# Patient Record
Sex: FEMALE | Race: WHITE | Employment: UNEMPLOYED | URBAN - METROPOLITAN AREA
[De-identification: names, ages, dates, MRNs, and addresses within clinical notes are randomized per-mention and may not be internally consistent; named-entity substitution may affect disease eponyms.]

---

## 2018-03-22 ENCOUNTER — HOSPITAL ENCOUNTER (EMERGENCY)
Age: 63
Discharge: HOME | End: 2018-03-22
Attending: EMERGENCY MEDICINE
Payer: COMMERCIAL

## 2018-03-22 VITALS
HEIGHT: 62 IN | DIASTOLIC BLOOD PRESSURE: 67 MMHG | WEIGHT: 180 LBS | HEART RATE: 55 BPM | OXYGEN SATURATION: 96 % | SYSTOLIC BLOOD PRESSURE: 129 MMHG | RESPIRATION RATE: 19 BRPM | TEMPERATURE: 97.7 F | BODY MASS INDEX: 33.13 KG/M2

## 2018-03-22 DIAGNOSIS — R55 SYNCOPE, UNSPECIFIED SYNCOPE TYPE: Primary | ICD-10-CM

## 2018-03-22 LAB
% CKMB: 5.6 % (ref 0–3)
ABSOLUTE EOS #: 0 K/UL (ref 0–0.4)
ABSOLUTE IMMATURE GRANULOCYTE: ABNORMAL K/UL (ref 0–0.3)
ABSOLUTE LYMPH #: 1.5 K/UL (ref 1–4.8)
ABSOLUTE MONO #: 0.5 K/UL (ref 0.2–0.8)
ANION GAP SERPL CALCULATED.3IONS-SCNC: 12 MMOL/L (ref 9–17)
BASOPHILS # BLD: 0 % (ref 0–2)
BASOPHILS ABSOLUTE: 0 K/UL (ref 0–0.2)
BNP INTERPRETATION: NORMAL
BUN BLDV-MCNC: 7 MG/DL (ref 8–23)
BUN/CREAT BLD: 9 (ref 9–20)
CALCIUM SERPL-MCNC: 9.2 MG/DL (ref 8.6–10.4)
CHLORIDE BLD-SCNC: 97 MMOL/L (ref 98–107)
CK MB: 1.4 NG/ML
CKMB INTERPRETATION: ABNORMAL
CO2: 24 MMOL/L (ref 20–31)
CREAT SERPL-MCNC: 0.78 MG/DL (ref 0.5–0.9)
D-DIMER QUANTITATIVE: 3.46 MG/L FEU
DIFFERENTIAL TYPE: ABNORMAL
EKG ATRIAL RATE: 54 BPM
EKG P AXIS: 39 DEGREES
EKG P-R INTERVAL: 154 MS
EKG Q-T INTERVAL: 406 MS
EKG QRS DURATION: 78 MS
EKG QTC CALCULATION (BAZETT): 385 MS
EKG R AXIS: 0 DEGREES
EKG T AXIS: 16 DEGREES
EKG VENTRICULAR RATE: 54 BPM
EOSINOPHILS RELATIVE PERCENT: 1 % (ref 1–4)
ETHANOL PERCENT: <0.01 %
ETHANOL: <10 MG/DL
GFR AFRICAN AMERICAN: >60 ML/MIN
GFR NON-AFRICAN AMERICAN: >60 ML/MIN
GFR SERPL CREATININE-BSD FRML MDRD: ABNORMAL ML/MIN/{1.73_M2}
GFR SERPL CREATININE-BSD FRML MDRD: ABNORMAL ML/MIN/{1.73_M2}
GLUCOSE BLD-MCNC: 102 MG/DL (ref 70–99)
HCT VFR BLD CALC: 37.3 % (ref 36–46)
HEMOGLOBIN: 12.4 G/DL (ref 12–16)
IMMATURE GRANULOCYTES: ABNORMAL %
INR BLD: 0.9
LYMPHOCYTES # BLD: 32 % (ref 24–44)
MAGNESIUM: 2.1 MG/DL (ref 1.6–2.6)
MCH RBC QN AUTO: 29.3 PG (ref 26–34)
MCHC RBC AUTO-ENTMCNC: 33.3 G/DL (ref 31–37)
MCV RBC AUTO: 88.1 FL (ref 80–100)
MONOCYTES # BLD: 10 % (ref 1–7)
MYOGLOBIN: 46 NG/ML (ref 25–58)
NRBC AUTOMATED: ABNORMAL PER 100 WBC
PARTIAL THROMBOPLASTIN TIME: 28.7 SEC (ref 23–31)
PDW BLD-RTO: 14.9 % (ref 11.5–14.5)
PLATELET # BLD: 266 K/UL (ref 130–400)
PLATELET ESTIMATE: ABNORMAL
PMV BLD AUTO: 7.3 FL (ref 6–12)
POTASSIUM SERPL-SCNC: 4 MMOL/L (ref 3.7–5.3)
PRO-BNP: 83 PG/ML
PROTHROMBIN TIME: 9.7 SEC (ref 9.7–11.6)
RBC # BLD: 4.24 M/UL (ref 4–5.2)
RBC # BLD: ABNORMAL 10*6/UL
SEG NEUTROPHILS: 57 % (ref 36–66)
SEGMENTED NEUTROPHILS ABSOLUTE COUNT: 2.7 K/UL (ref 1.8–7.7)
SODIUM BLD-SCNC: 133 MMOL/L (ref 135–144)
TOTAL CK: 25 U/L (ref 26–192)
TROPONIN INTERP: NORMAL
TROPONIN T: <0.03 NG/ML
WBC # BLD: 4.7 K/UL (ref 3.5–11)
WBC # BLD: ABNORMAL 10*3/UL

## 2018-03-22 PROCEDURE — 85379 FIBRIN DEGRADATION QUANT: CPT

## 2018-03-22 PROCEDURE — 83735 ASSAY OF MAGNESIUM: CPT

## 2018-03-22 PROCEDURE — 85025 COMPLETE CBC W/AUTO DIFF WBC: CPT

## 2018-03-22 PROCEDURE — 85610 PROTHROMBIN TIME: CPT

## 2018-03-22 PROCEDURE — 93005 ELECTROCARDIOGRAM TRACING: CPT

## 2018-03-22 PROCEDURE — 83880 ASSAY OF NATRIURETIC PEPTIDE: CPT

## 2018-03-22 PROCEDURE — 80048 BASIC METABOLIC PNL TOTAL CA: CPT

## 2018-03-22 PROCEDURE — 85730 THROMBOPLASTIN TIME PARTIAL: CPT

## 2018-03-22 PROCEDURE — 2580000003 HC RX 258: Performed by: EMERGENCY MEDICINE

## 2018-03-22 PROCEDURE — 82550 ASSAY OF CK (CPK): CPT

## 2018-03-22 PROCEDURE — 99284 EMERGENCY DEPT VISIT MOD MDM: CPT

## 2018-03-22 PROCEDURE — 84484 ASSAY OF TROPONIN QUANT: CPT

## 2018-03-22 PROCEDURE — G0480 DRUG TEST DEF 1-7 CLASSES: HCPCS

## 2018-03-22 PROCEDURE — 83874 ASSAY OF MYOGLOBIN: CPT

## 2018-03-22 PROCEDURE — 82553 CREATINE MB FRACTION: CPT

## 2018-03-22 RX ORDER — HYDROCHLOROTHIAZIDE 12.5 MG/1
CAPSULE, GELATIN COATED ORAL
Refills: 1 | COMMUNITY
Start: 2018-01-15

## 2018-03-22 RX ORDER — PANCRELIPASE 24000; 76000; 120000 [USP'U]/1; [USP'U]/1; [USP'U]/1
CAPSULE, DELAYED RELEASE PELLETS ORAL
Refills: 0 | COMMUNITY
Start: 2018-03-21

## 2018-03-22 RX ORDER — HYDROCODONE BITARTRATE AND ACETAMINOPHEN 5; 325 MG/1; MG/1
TABLET ORAL
Refills: 0 | COMMUNITY
Start: 2018-03-05

## 2018-03-22 RX ORDER — PITAVASTATIN CALCIUM 2.09 MG/1
TABLET, FILM COATED ORAL
Refills: 0 | COMMUNITY
Start: 2018-01-25

## 2018-03-22 RX ORDER — NEBIVOLOL HYDROCHLORIDE 10 MG/1
TABLET ORAL
Refills: 1 | COMMUNITY
Start: 2018-03-21

## 2018-03-22 RX ORDER — ZOLPIDEM TARTRATE 10 MG/1
TABLET ORAL
Refills: 0 | COMMUNITY
Start: 2018-03-05

## 2018-03-22 RX ORDER — LABETALOL 200 MG/1
TABLET, FILM COATED ORAL
Refills: 1 | COMMUNITY
Start: 2018-03-01

## 2018-03-22 RX ORDER — DOXYCYCLINE HYCLATE 100 MG/1
CAPSULE ORAL
Refills: 0 | COMMUNITY
Start: 2018-02-21

## 2018-03-22 RX ORDER — FUROSEMIDE 20 MG/1
TABLET ORAL
Refills: 1 | COMMUNITY
Start: 2018-03-12

## 2018-03-22 RX ORDER — ALPRAZOLAM 0.25 MG/1
TABLET ORAL
Refills: 0 | COMMUNITY
Start: 2017-12-22

## 2018-03-22 RX ORDER — CETIRIZINE HYDROCHLORIDE, PSEUDOEPHEDRINE HYDROCHLORIDE 5; 120 MG/1; MG/1
TABLET, FILM COATED, EXTENDED RELEASE ORAL
Refills: 0 | COMMUNITY
Start: 2018-03-20

## 2018-03-22 RX ORDER — GABAPENTIN 300 MG/1
CAPSULE ORAL
Refills: 0 | COMMUNITY
Start: 2017-12-17

## 2018-03-22 RX ORDER — ALBUTEROL SULFATE 90 UG/1
2 AEROSOL, METERED RESPIRATORY (INHALATION)
COMMUNITY

## 2018-03-22 RX ORDER — CLOBETASOL PROPIONATE 0.46 MG/ML
SOLUTION TOPICAL
Refills: 1 | COMMUNITY
Start: 2017-12-22

## 2018-03-22 RX ORDER — DULOXETIN HYDROCHLORIDE 60 MG/1
120 CAPSULE, DELAYED RELEASE ORAL
COMMUNITY

## 2018-03-22 RX ORDER — FLECAINIDE ACETATE 100 MG/1
TABLET ORAL
Refills: 0 | COMMUNITY
Start: 2018-01-15

## 2018-03-22 RX ORDER — PREDNISONE 1 MG/1
TABLET ORAL
Refills: 0 | COMMUNITY
Start: 2018-02-12

## 2018-03-22 RX ORDER — LANSOPRAZOLE 30 MG/1
CAPSULE, DELAYED RELEASE ORAL
Refills: 1 | COMMUNITY
Start: 2018-02-27

## 2018-03-22 RX ORDER — SPIRONOLACTONE 50 MG/1
TABLET, FILM COATED ORAL
Refills: 0 | COMMUNITY
Start: 2018-02-27

## 2018-03-22 RX ORDER — SALSALATE 750 MG
TABLET ORAL
Refills: 1 | COMMUNITY
Start: 2018-02-27

## 2018-03-22 RX ORDER — TEMAZEPAM 30 MG/1
CAPSULE ORAL
Refills: 0 | COMMUNITY
Start: 2018-02-13

## 2018-03-22 RX ORDER — 0.9 % SODIUM CHLORIDE 0.9 %
1000 INTRAVENOUS SOLUTION INTRAVENOUS ONCE
Status: COMPLETED | OUTPATIENT
Start: 2018-03-22 | End: 2018-03-22

## 2018-03-22 RX ORDER — TIZANIDINE 4 MG/1
TABLET ORAL
Refills: 0 | COMMUNITY
Start: 2017-12-22

## 2018-03-22 RX ORDER — LEVOTHYROXINE SODIUM 0.1 MG/1
TABLET ORAL
Refills: 1 | COMMUNITY
Start: 2018-02-27

## 2018-03-22 RX ADMIN — SODIUM CHLORIDE 1000 ML: 9 INJECTION, SOLUTION INTRAVENOUS at 12:46

## 2018-03-22 NOTE — ED PROVIDER NOTES
[facility] ED  eMERGENCY dEPARTMENT eNCOUnter      Pt Name: Farnaz Edwards  MRN: [id number]  [place] 1955  Date of evaluation: 3/22/2018  Provider: Gabriela Saldana MD    CHIEF COMPLAINT       Chief Complaint   Patient presents with    Loss of Consciousness         HISTORY OF PRESENT ILLNESS   (Location/Symptom, Timing/Onset, Context/Setting, Quality, Duration, Modifying Factors, Severity)  Note limiting factors. aFrnaz Edwards is a 61 y.o. female who presents to the emergency department For evaluation of a syncopal episode that she experienced while walking from her car to her cardiologist's office this morning at the [facility]. Patient has had episodes of lightheadedness and dizziness and near syncopal symptoms for the last 20 years, but actual syncope has been experienced off and on since August.  She has had a loop recorder implanted since 29 January by her cardiologist Dr. Orion Gusman. The history is provided by the patient, the spouse and medical records. Nursing Notes were reviewed. REVIEW OF SYSTEMS    (2-9 systems for level 4, 10 or more for level 5)     Review of Systems   All other systems reviewed and are negative. Except as noted above the remainder of the review of systems was reviewed and negative. PAST MEDICAL HISTORY     Past Medical History:   Diagnosis Date    Acne rosacea     CMV infection ([facility] [address]     chronic    Gluten intolerance     Heart murmur     Hypertension     Hypoglycemia     Hypothyroidism     Palpitations          SURGICAL HISTORY       Past Surgical History:   Procedure Laterality Date    LASIK  05/18/1999         CURRENT MEDICATIONS       Previous Medications    ALBUTEROL SULFATE  (90 BASE) MCG/ACT INHALER    Inhale 2 puffs into the lungs    ALPRAZOLAM (XANAX) 0.25 MG TABLET        AMLODIPINE BESYLATE (NORVASC PO)    Take  by mouth.     BYSTOLIC 10 MG TABLET        CETIRIZINE-PSUEDOEPHEDRINE (ZYRTEC-D) 5-120 MG PER EXTENDED RELEASE TABLET CK ISOENZYMES - Abnormal; Notable for the following:        Result Value    Total CK 25 (*)     % CKMB 5.6 (*)     All other components within normal limits   CBC WITH AUTO DIFFERENTIAL - Abnormal; Notable for the following:     RDW 14.9 (*)     Monocytes 10 (*)     All other components within normal limits   BASIC METABOLIC PANEL - Abnormal; Notable for the following:     Glucose 102 (*)     BUN 7 (*)     Sodium 133 (*)     Chloride 97 (*)     All other components within normal limits   APTT   PROTIME-INR   TROP/MYOGLOBIN   BRAIN NATRIURETIC PEPTIDE   D-DIMER, QUANTITATIVE   MAGNESIUM   ETHANOL       All other labs were within normal range or not returned as of this dictation. EMERGENCY DEPARTMENT COURSE and DIFFERENTIAL DIAGNOSIS/MDM:   Vitals:    Vitals:    03/22/18 1135 03/22/18 1150 03/22/18 1205 03/22/18 1220   BP: 133/68 134/64 120/61 129/67   Pulse: 54 54 54 55   Resp: 17 17 21 19   Temp:       TempSrc:       SpO2: 98% 98% 98% 96%   Weight:       Height:           Dr. Yoli Fermin was contacted and the patient's loop recorder was interrogated. No events have been recorded since this morning. Patient is infused with 1 L of IV normal saline then discharged and instructed to contact her primary care physician for further workup. MDM    CRITICAL CARE TIME   Total Critical Care time was 30 minutes, excluding separately reportable procedures. There was a high probability of clinically significant/life threatening deterioration in the patient's condition which required my urgent intervention. CONSULTS:  IP CONSULT TO CARDIOLOGY    PROCEDURES:  Unless otherwise noted below, none     Procedures    FINAL IMPRESSION      1.  Syncope, unspecified syncope type          DISPOSITION/PLAN   DISPOSITION Decision To Discharge 03/22/2018 01:30:19 PM      PATIENT REFERRED TO:  Reji Steiner MD  [address] [place] [address]  [phone]    Schedule an appointment as soon as possible for a visit

## 2025-03-24 ENCOUNTER — APPOINTMENT (OUTPATIENT)
Dept: CT IMAGING | Age: 70
DRG: 391 | End: 2025-03-24
Payer: MEDICARE

## 2025-03-24 ENCOUNTER — HOSPITAL ENCOUNTER (INPATIENT)
Age: 70
LOS: 3 days | Discharge: HOME | DRG: 391 | End: 2025-03-27
Attending: STUDENT IN AN ORGANIZED HEALTH CARE EDUCATION/TRAINING PROGRAM | Admitting: INTERNAL MEDICINE
Payer: MEDICARE

## 2025-03-24 DIAGNOSIS — K57.92 ACUTE DIVERTICULITIS: Primary | ICD-10-CM

## 2025-03-24 LAB
ALBUMIN SERPL-MCNC: 4.2 G/DL (ref 3.5–5.2)
ALBUMIN/GLOB SERPL: 1.6 {RATIO} (ref 1–2.5)
ALP SERPL-CCNC: 58 U/L (ref 35–104)
ALT SERPL-CCNC: 18 U/L (ref 5–33)
ANION GAP SERPL CALCULATED.3IONS-SCNC: 7 MMOL/L (ref 9–17)
AST SERPL-CCNC: 24 U/L
BASOPHILS # BLD: 0.03 K/UL (ref 0–0.2)
BASOPHILS NFR BLD: 0 % (ref 0–2)
BILIRUB SERPL-MCNC: 0.6 MG/DL (ref 0.3–1.2)
BILIRUB UR QL STRIP: NEGATIVE
BUN SERPL-MCNC: 9 MG/DL (ref 8–23)
BUN/CREAT SERPL: 13 (ref 9–20)
CALCIUM SERPL-MCNC: 9.1 MG/DL (ref 8.6–10.4)
CHLORIDE SERPL-SCNC: 99 MMOL/L (ref 98–107)
CLARITY UR: CLEAR
CO2 SERPL-SCNC: 27 MMOL/L (ref 20–31)
COLOR UR: YELLOW
COMMENT: ABNORMAL
CREAT SERPL-MCNC: 0.7 MG/DL (ref 0.5–0.9)
EOSINOPHIL # BLD: 0.03 K/UL (ref 0–0.44)
EOSINOPHILS RELATIVE PERCENT: 0 % (ref 1–4)
ERYTHROCYTE [DISTWIDTH] IN BLOOD BY AUTOMATED COUNT: 12.4 % (ref 11.8–14.4)
GFR, ESTIMATED: >90 ML/MIN/1.73M2
GLUCOSE SERPL-MCNC: 112 MG/DL (ref 70–99)
GLUCOSE UR STRIP-MCNC: NEGATIVE MG/DL
HCT VFR BLD AUTO: 41.1 % (ref 36.3–47.1)
HGB BLD-MCNC: 13.9 G/DL (ref 11.9–15.1)
HGB UR QL STRIP.AUTO: NEGATIVE
IMM GRANULOCYTES # BLD AUTO: <0.03 K/UL (ref 0–0.3)
IMM GRANULOCYTES NFR BLD: 0 %
INR PPP: 1.1
KETONES UR STRIP-MCNC: NEGATIVE MG/DL
LACTATE BLDV-SCNC: 1 MMOL/L (ref 0.5–2.2)
LEUKOCYTE ESTERASE UR QL STRIP: NEGATIVE
LIPASE SERPL-CCNC: 13 U/L (ref 13–60)
LYMPHOCYTES NFR BLD: 0.92 K/UL (ref 1.1–3.7)
LYMPHOCYTES RELATIVE PERCENT: 12 % (ref 24–43)
MCH RBC QN AUTO: 31.2 PG (ref 25.2–33.5)
MCHC RBC AUTO-ENTMCNC: 33.8 G/DL (ref 25.2–33.5)
MCV RBC AUTO: 92.4 FL (ref 82.6–102.9)
MONOCYTES NFR BLD: 0.57 K/UL (ref 0.1–1.2)
MONOCYTES NFR BLD: 7 % (ref 3–12)
NEUTROPHILS NFR BLD: 81 % (ref 36–65)
NEUTS SEG NFR BLD: 6.3 K/UL (ref 1.5–8.1)
NITRITE UR QL STRIP: NEGATIVE
NRBC BLD-RTO: 0 PER 100 WBC
PARTIAL THROMBOPLASTIN TIME: 34.3 SEC (ref 23.9–33.8)
PH UR STRIP: 6.5 [PH] (ref 5–6)
PLATELET # BLD AUTO: 227 K/UL (ref 138–453)
PMV BLD AUTO: 10 FL (ref 8.1–13.5)
POTASSIUM SERPL-SCNC: 4.3 MMOL/L (ref 3.7–5.3)
PROT SERPL-MCNC: 6.8 G/DL (ref 6.4–8.3)
PROT UR STRIP-MCNC: NEGATIVE MG/DL
PROTHROMBIN TIME: 13.9 SEC (ref 11.5–14.2)
RBC # BLD AUTO: 4.45 M/UL (ref 3.95–5.11)
SODIUM SERPL-SCNC: 133 MMOL/L (ref 135–144)
SP GR UR STRIP: 1.02 (ref 1.01–1.02)
UROBILINOGEN UR STRIP-ACNC: NORMAL EU/DL (ref 0–1)
WBC OTHER # BLD: 7.9 K/UL (ref 3.5–11.3)

## 2025-03-24 PROCEDURE — 99222 1ST HOSP IP/OBS MODERATE 55: CPT | Performed by: INTERNAL MEDICINE

## 2025-03-24 PROCEDURE — 83605 ASSAY OF LACTIC ACID: CPT

## 2025-03-24 PROCEDURE — 81003 URINALYSIS AUTO W/O SCOPE: CPT

## 2025-03-24 PROCEDURE — 2580000003 HC RX 258: Performed by: STUDENT IN AN ORGANIZED HEALTH CARE EDUCATION/TRAINING PROGRAM

## 2025-03-24 PROCEDURE — 6360000002 HC RX W HCPCS: Performed by: INTERNAL MEDICINE

## 2025-03-24 PROCEDURE — 85730 THROMBOPLASTIN TIME PARTIAL: CPT

## 2025-03-24 PROCEDURE — 6360000004 HC RX CONTRAST MEDICATION: Performed by: STUDENT IN AN ORGANIZED HEALTH CARE EDUCATION/TRAINING PROGRAM

## 2025-03-24 PROCEDURE — 2580000003 HC RX 258: Performed by: INTERNAL MEDICINE

## 2025-03-24 PROCEDURE — 96375 TX/PRO/DX INJ NEW DRUG ADDON: CPT

## 2025-03-24 PROCEDURE — 99285 EMERGENCY DEPT VISIT HI MDM: CPT

## 2025-03-24 PROCEDURE — 80053 COMPREHEN METABOLIC PANEL: CPT

## 2025-03-24 PROCEDURE — 6370000000 HC RX 637 (ALT 250 FOR IP): Performed by: INTERNAL MEDICINE

## 2025-03-24 PROCEDURE — 83690 ASSAY OF LIPASE: CPT

## 2025-03-24 PROCEDURE — 2700000000 HC OXYGEN THERAPY PER DAY

## 2025-03-24 PROCEDURE — 2060000000 HC ICU INTERMEDIATE R&B

## 2025-03-24 PROCEDURE — 85610 PROTHROMBIN TIME: CPT

## 2025-03-24 PROCEDURE — 85025 COMPLETE CBC W/AUTO DIFF WBC: CPT

## 2025-03-24 PROCEDURE — 96374 THER/PROPH/DIAG INJ IV PUSH: CPT

## 2025-03-24 PROCEDURE — 74177 CT ABD & PELVIS W/CONTRAST: CPT

## 2025-03-24 PROCEDURE — 96376 TX/PRO/DX INJ SAME DRUG ADON: CPT

## 2025-03-24 PROCEDURE — 6360000002 HC RX W HCPCS: Performed by: STUDENT IN AN ORGANIZED HEALTH CARE EDUCATION/TRAINING PROGRAM

## 2025-03-24 RX ORDER — SODIUM CHLORIDE 0.9 % (FLUSH) 0.9 %
10 SYRINGE (ML) INJECTION EVERY 12 HOURS SCHEDULED
Status: DISCONTINUED | OUTPATIENT
Start: 2025-03-24 | End: 2025-03-27 | Stop reason: HOSPADM

## 2025-03-24 RX ORDER — MAGNESIUM OXIDE 400 MG/1
400 TABLET ORAL DAILY
COMMUNITY

## 2025-03-24 RX ORDER — SODIUM CHLORIDE 9 MG/ML
INJECTION, SOLUTION INTRAVENOUS PRN
Status: DISCONTINUED | OUTPATIENT
Start: 2025-03-24 | End: 2025-03-27 | Stop reason: HOSPADM

## 2025-03-24 RX ORDER — SODIUM CHLORIDE 9 MG/ML
INJECTION, SOLUTION INTRAVENOUS CONTINUOUS
Status: DISCONTINUED | OUTPATIENT
Start: 2025-03-24 | End: 2025-03-27 | Stop reason: HOSPADM

## 2025-03-24 RX ORDER — DENOSUMAB 60 MG/ML
60 INJECTION SUBCUTANEOUS
COMMUNITY

## 2025-03-24 RX ORDER — BUSPIRONE HYDROCHLORIDE 10 MG/1
1 TABLET ORAL DAILY
COMMUNITY

## 2025-03-24 RX ORDER — SODIUM CHLORIDE FOR INHALATION 0.9 %
3 VIAL, NEBULIZER (ML) INHALATION
Status: DISCONTINUED | OUTPATIENT
Start: 2025-03-24 | End: 2025-03-27 | Stop reason: HOSPADM

## 2025-03-24 RX ORDER — MORPHINE SULFATE 4 MG/ML
4 INJECTION, SOLUTION INTRAMUSCULAR; INTRAVENOUS ONCE
Refills: 0 | Status: COMPLETED | OUTPATIENT
Start: 2025-03-24 | End: 2025-03-24

## 2025-03-24 RX ORDER — OXYCODONE HYDROCHLORIDE 5 MG/1
5 TABLET ORAL 2 TIMES DAILY
Status: ON HOLD | COMMUNITY
End: 2025-03-27 | Stop reason: HOSPADM

## 2025-03-24 RX ORDER — ACETAMINOPHEN 325 MG/1
650 TABLET ORAL EVERY 4 HOURS PRN
Status: DISCONTINUED | OUTPATIENT
Start: 2025-03-24 | End: 2025-03-27 | Stop reason: HOSPADM

## 2025-03-24 RX ORDER — FLUTICASONE FUROATE AND VILANTEROL 200; 25 UG/1; UG/1
1 POWDER RESPIRATORY (INHALATION)
COMMUNITY

## 2025-03-24 RX ORDER — LEVOTHYROXINE SODIUM 100 UG/1
100 TABLET ORAL DAILY
Status: DISCONTINUED | OUTPATIENT
Start: 2025-03-24 | End: 2025-03-27 | Stop reason: HOSPADM

## 2025-03-24 RX ORDER — IOPAMIDOL 755 MG/ML
100 INJECTION, SOLUTION INTRAVASCULAR
Status: COMPLETED | OUTPATIENT
Start: 2025-03-24 | End: 2025-03-24

## 2025-03-24 RX ORDER — BUDESONIDE AND FORMOTEROL FUMARATE DIHYDRATE 160; 4.5 UG/1; UG/1
2 AEROSOL RESPIRATORY (INHALATION)
Status: DISCONTINUED | OUTPATIENT
Start: 2025-03-24 | End: 2025-03-25 | Stop reason: CLARIF

## 2025-03-24 RX ORDER — ESCITALOPRAM OXALATE 10 MG/1
10 TABLET ORAL DAILY
Status: DISCONTINUED | OUTPATIENT
Start: 2025-03-24 | End: 2025-03-27 | Stop reason: HOSPADM

## 2025-03-24 RX ORDER — 0.9 % SODIUM CHLORIDE 0.9 %
1000 INTRAVENOUS SOLUTION INTRAVENOUS ONCE
Status: COMPLETED | OUTPATIENT
Start: 2025-03-24 | End: 2025-03-24

## 2025-03-24 RX ORDER — SODIUM CHLORIDE 9 MG/ML
INJECTION, SOLUTION INTRAVENOUS CONTINUOUS
Status: DISCONTINUED | OUTPATIENT
Start: 2025-03-24 | End: 2025-03-24

## 2025-03-24 RX ORDER — CETIRIZINE HYDROCHLORIDE 10 MG/1
10 TABLET ORAL DAILY
COMMUNITY

## 2025-03-24 RX ORDER — LOSARTAN POTASSIUM 50 MG/1
50 TABLET ORAL DAILY
COMMUNITY

## 2025-03-24 RX ORDER — BUSPIRONE HYDROCHLORIDE 5 MG/1
10 TABLET ORAL DAILY
Status: DISCONTINUED | OUTPATIENT
Start: 2025-03-24 | End: 2025-03-27 | Stop reason: HOSPADM

## 2025-03-24 RX ORDER — PANTOPRAZOLE SODIUM 40 MG/1
40 TABLET, DELAYED RELEASE ORAL DAILY
COMMUNITY

## 2025-03-24 RX ORDER — ONDANSETRON 2 MG/ML
4 INJECTION INTRAMUSCULAR; INTRAVENOUS EVERY 6 HOURS PRN
Status: DISCONTINUED | OUTPATIENT
Start: 2025-03-24 | End: 2025-03-27 | Stop reason: HOSPADM

## 2025-03-24 RX ORDER — ALBUTEROL SULFATE 0.83 MG/ML
2.5 SOLUTION RESPIRATORY (INHALATION)
Status: DISCONTINUED | OUTPATIENT
Start: 2025-03-24 | End: 2025-03-27 | Stop reason: HOSPADM

## 2025-03-24 RX ORDER — LOSARTAN POTASSIUM 50 MG/1
50 TABLET ORAL DAILY
Status: DISCONTINUED | OUTPATIENT
Start: 2025-03-25 | End: 2025-03-27 | Stop reason: HOSPADM

## 2025-03-24 RX ORDER — GABAPENTIN 300 MG/1
300 CAPSULE ORAL
Status: DISCONTINUED | OUTPATIENT
Start: 2025-03-24 | End: 2025-03-25

## 2025-03-24 RX ORDER — ACETAMINOPHEN 325 MG/1
325 TABLET ORAL EVERY 8 HOURS PRN
COMMUNITY

## 2025-03-24 RX ORDER — ENOXAPARIN SODIUM 100 MG/ML
40 INJECTION SUBCUTANEOUS DAILY
Status: DISCONTINUED | OUTPATIENT
Start: 2025-03-24 | End: 2025-03-27 | Stop reason: HOSPADM

## 2025-03-24 RX ORDER — ESCITALOPRAM OXALATE 10 MG/1
10 TABLET ORAL DAILY
COMMUNITY

## 2025-03-24 RX ORDER — AZELASTINE 1 MG/ML
1 SPRAY, METERED NASAL 2 TIMES DAILY PRN
COMMUNITY

## 2025-03-24 RX ORDER — SODIUM CHLORIDE 0.9 % (FLUSH) 0.9 %
10 SYRINGE (ML) INJECTION PRN
Status: DISCONTINUED | OUTPATIENT
Start: 2025-03-24 | End: 2025-03-27 | Stop reason: HOSPADM

## 2025-03-24 RX ORDER — ONDANSETRON 2 MG/ML
4 INJECTION INTRAMUSCULAR; INTRAVENOUS ONCE
Status: COMPLETED | OUTPATIENT
Start: 2025-03-24 | End: 2025-03-24

## 2025-03-24 RX ORDER — SECUKINUMAB 150 MG/ML
150 INJECTION SUBCUTANEOUS
COMMUNITY

## 2025-03-24 RX ADMIN — LEVOTHYROXINE SODIUM 100 MCG: 0.1 TABLET ORAL at 16:37

## 2025-03-24 RX ADMIN — HYDROMORPHONE HYDROCHLORIDE 1 MG: 1 INJECTION, SOLUTION INTRAMUSCULAR; INTRAVENOUS; SUBCUTANEOUS at 21:20

## 2025-03-24 RX ADMIN — SODIUM CHLORIDE: 0.9 INJECTION, SOLUTION INTRAVENOUS at 17:29

## 2025-03-24 RX ADMIN — IOPAMIDOL 100 ML: 755 INJECTION, SOLUTION INTRAVENOUS at 12:33

## 2025-03-24 RX ADMIN — GABAPENTIN 300 MG: 300 CAPSULE ORAL at 22:21

## 2025-03-24 RX ADMIN — GABAPENTIN 300 MG: 300 CAPSULE ORAL at 21:20

## 2025-03-24 RX ADMIN — PIPERACILLIN AND TAZOBACTAM 4500 MG: 4; .5 INJECTION, POWDER, LYOPHILIZED, FOR SOLUTION INTRAVENOUS at 14:23

## 2025-03-24 RX ADMIN — HYDROMORPHONE HYDROCHLORIDE 1 MG: 1 INJECTION, SOLUTION INTRAMUSCULAR; INTRAVENOUS; SUBCUTANEOUS at 17:22

## 2025-03-24 RX ADMIN — SODIUM CHLORIDE 1000 ML: 0.9 INJECTION, SOLUTION INTRAVENOUS at 12:07

## 2025-03-24 RX ADMIN — ESCITALOPRAM OXALATE 10 MG: 10 TABLET ORAL at 16:37

## 2025-03-24 RX ADMIN — ENOXAPARIN SODIUM 40 MG: 100 INJECTION SUBCUTANEOUS at 16:37

## 2025-03-24 RX ADMIN — MORPHINE SULFATE 4 MG: 4 INJECTION, SOLUTION INTRAMUSCULAR; INTRAVENOUS at 13:54

## 2025-03-24 RX ADMIN — ONDANSETRON 4 MG: 2 INJECTION, SOLUTION INTRAMUSCULAR; INTRAVENOUS at 12:10

## 2025-03-24 RX ADMIN — SODIUM CHLORIDE: 0.9 INJECTION, SOLUTION INTRAVENOUS at 14:23

## 2025-03-24 RX ADMIN — PIPERACILLIN AND TAZOBACTAM 3375 MG: 3; .375 INJECTION, POWDER, LYOPHILIZED, FOR SOLUTION INTRAVENOUS at 22:25

## 2025-03-24 RX ADMIN — MORPHINE SULFATE 4 MG: 4 INJECTION, SOLUTION INTRAMUSCULAR; INTRAVENOUS at 12:10

## 2025-03-24 RX ADMIN — BUSPIRONE HYDROCHLORIDE 10 MG: 5 TABLET ORAL at 16:37

## 2025-03-24 ASSESSMENT — PAIN DESCRIPTION - DESCRIPTORS
DESCRIPTORS: SORE
DESCRIPTORS: ACHING;BURNING
DESCRIPTORS: PRESSURE;ACHING

## 2025-03-24 ASSESSMENT — PAIN DESCRIPTION - LOCATION
LOCATION: ABDOMEN

## 2025-03-24 ASSESSMENT — ENCOUNTER SYMPTOMS
VOMITING: 0
SHORTNESS OF BREATH: 0
COUGH: 0
ABDOMINAL PAIN: 1

## 2025-03-24 ASSESSMENT — PAIN SCALES - GENERAL
PAINLEVEL_OUTOF10: 7
PAINLEVEL_OUTOF10: 7
PAINLEVEL_OUTOF10: 5
PAINLEVEL_OUTOF10: 7
PAINLEVEL_OUTOF10: 4

## 2025-03-24 ASSESSMENT — LIFESTYLE VARIABLES
HOW MANY STANDARD DRINKS CONTAINING ALCOHOL DO YOU HAVE ON A TYPICAL DAY: PATIENT DOES NOT DRINK
HOW OFTEN DO YOU HAVE A DRINK CONTAINING ALCOHOL: NEVER

## 2025-03-24 ASSESSMENT — PAIN DESCRIPTION - PAIN TYPE: TYPE: ACUTE PAIN

## 2025-03-24 ASSESSMENT — PAIN DESCRIPTION - ORIENTATION
ORIENTATION: LOWER
ORIENTATION: RIGHT;LOWER;MID

## 2025-03-24 ASSESSMENT — PAIN - FUNCTIONAL ASSESSMENT: PAIN_FUNCTIONAL_ASSESSMENT: 0-10

## 2025-03-24 NOTE — ED NOTES
ED Report    Presents to ED from: Home    Chief Complaint   Patient presents with    Abdominal Pain     Started Sat or Sunday. Denies any N/V/D     No diagnosis found.  Present Condition: stable  Pertinent Event(s): pt with abd pain for 2 days. Tender to RLQ. Diverticulitis. Getting IV antibiotics. Pt given 2 doses of morphine with some relief placed on O2 after giving the morphine for when she drops of to sleep for awhile. Pt with chronic back pain.     LOC:  A+O x 4  Code Status: FULL    Vital Signs   Vitals:    03/24/25 1128   BP: (!) 101/57   Pulse: 85   Resp: 16   Temp: 99.5 °F (37.5 °C)   TempSrc: Tympanic   SpO2: 95%   Weight: 74.4 kg (164 lb)   Height: 1.575 m (5' 2\")     Oxygen Baseline: Room Air       Current Oxygen Needs: Nasal Cannula at 2L/min  Mobility: Independent       Mobility Aide: Independent    LDAs:   Peripheral IV 03/24/25 Right Antecubital (Active)   Site Assessment Clean, dry & intact 03/24/25 1204   Line Status Blood return noted;Normal saline locked;Specimen collected;Flushed 03/24/25 1204   Phlebitis Assessment No symptoms 03/24/25 1204   Infiltration Assessment 0 03/24/25 1204   Dressing Status New dressing applied 03/24/25 1204   Dressing Type Transparent 03/24/25 1204   Dressing Intervention New 03/24/25 1204     Abnormal ED Labs:    Labs Reviewed   CBC WITH AUTO DIFFERENTIAL - Abnormal; Notable for the following components:       Result Value    MCHC 33.8 (*)     Neutrophils % 81 (*)     Lymphocytes % 12 (*)     Eosinophils % 0 (*)     Lymphocytes Absolute 0.92 (*)     All other components within normal limits   COMPREHENSIVE METABOLIC PANEL - Abnormal; Notable for the following components:    Sodium 133 (*)     Anion Gap 7 (*)     Glucose 112 (*)     All other components within normal limits   URINALYSIS WITH REFLEX TO CULTURE - Abnormal; Notable for the following components:    pH, Urine 6.5 (*)     All other components within normal limits   LACTIC ACID   LIPASE     Imaging:    CT

## 2025-03-24 NOTE — ED PROVIDER NOTES
[facility] Emergency Department  [address] [address]  Phone: [phone]        [facility] EMERGENCY DEPARTMENT  EMERGENCY DEPARTMENT ENCOUNTER      Pt Name: Yaz Torres  MRN: [id number]  Birthdate 1955  Date of evaluation: 3/24/2025  Provider: Genet Cuellar DO    CHIEF COMPLAINT       Chief Complaint   Patient presents with    Abdominal Pain     Started Sat or Sunday. Denies any N/V/D       HISTORY OF PRESENT ILLNESS   (Location/Symptom, Timing/Onset,Context/Setting, Quality, Duration, Modifying Factors, Severity)  Note limiting factors.     Yaz Torres is a 70 y.o. female who presents to the emergency department with abdominal pain mainly in the right lower quadrant.  States he is rating over to the left lower quadrant.  Ongoing since this weekend.  No associated nausea or vomiting.  History of a previous abdominoplasty about 15 years ago as well as a hysterectomy, no other abdominal surgeries.  No known fevers.  No known sick contacts.  States she has not had much of an appetite.  No dysuria or urinary frequency.  States when she was coming in here the bumps on the car ride were increasing her abdominal pain.    Nursing Notes were reviewed.    REVIEW OF SYSTEMS       Review of Systems   Constitutional:  Positive for appetite change and fatigue. Negative for chills and fever.   HENT:  Negative for congestion.    Respiratory:  Negative for cough and shortness of breath.    Cardiovascular:  Negative for chest pain.   Gastrointestinal:  Positive for abdominal pain. Negative for vomiting.   Genitourinary:  Negative for dysuria.   Skin:  Negative for rash.       PAST MEDICAL HISTORY     Past Medical History:   Diagnosis Date    Acne rosacea     CMV infection (HCC)     chronic    Gluten intolerance     Heart murmur     Hypertension     Hypoglycemia     Hypothyroidism     Palpitations        SURGICAL HISTORY       Past Surgical History:   Procedure Laterality Date    LASIK

## 2025-03-24 NOTE — H&P
HOSPITALIST ADMISSION H&P      REASON FOR ADMISSION:  Diverticulitis with peritonitis  ESTIMATED LENGTH OF STAY:   > 2 midnights---2-4 days    ATTENDING/ADMITTING PHYSICIAN: Ermias Osei MD MD  PCP: Chon Jane, Pharm D. MD--see below     HISTORY OF PRESENT ILLNESS:      The patient is a 70 y.o. female patient of Chon Jane MD--[facility]--- who presents with RUQ--suprapubic abdominal tenderness--CT AP 3.24.2025 ---> distal sigmoid diverticulitis with surrounding peritonitis----onset 3-4 days before seeking medical attention.  No nausea--vomiting--no fever-chills---no black stool--tarry stool--melena--BRBPR.  Has had recent constipation with fecal incontinence of formed stool with valsalva maneuvers such as lifting---occurs without warning.  Prior major abdominal-pelvic surgery BURAK-BSO for fibroids and endometriosis.    Asthma--bronciectasis---prior history of PNA     MVP    Bradycardia--PVCs    Neck--lumbar fusions--with spinal stimulators for each       See below for additional PMH.    Patient qmmj-[id number]-xwkivwvh-available records reviewed, including, but not limited to, ER reports---labs--imaging--office records---personal notes        Past Medical History:   Diagnosis Date    Acne rosacea     Anxiety     CMV infection (HCC)     chronic    Gluten intolerance     Heart murmur     Hypertension     Hypoglycemia     Hypothyroidism     Other disorders of kidney and ureter in diseases classified elsewhere     Palpitations     Pneumonia            Past Surgical History:   Procedure Laterality Date    BACK SURGERY      COLONOSCOPY      HYSTERECTOMY (CERVIX STATUS UNKNOWN)      JOINT REPLACEMENT Bilateral     hips    LASIK  05/18/1999       Medications Prior to Admission:    Medications Prior to Admission: oxyCODONE (ROXICODONE) 5 MG immediate release tablet, Take 1 tablet by mouth in the morning and at bedtime.  fluticasone furoate-vilanterol (BREO ELLIPTA) 200-25 MCG/ACT AEPB inhaler, Inhale 1 puff

## 2025-03-25 LAB
ALBUMIN SERPL-MCNC: 3.3 G/DL (ref 3.5–5.2)
ANION GAP SERPL CALCULATED.3IONS-SCNC: 7 MMOL/L (ref 9–17)
BASOPHILS # BLD: 0.04 K/UL (ref 0–0.2)
BASOPHILS NFR BLD: 1 % (ref 0–2)
BUN SERPL-MCNC: 7 MG/DL (ref 8–23)
BUN/CREAT SERPL: 10 (ref 9–20)
CALCIUM SERPL-MCNC: 7.5 MG/DL (ref 8.6–10.4)
CHLORIDE SERPL-SCNC: 106 MMOL/L (ref 98–107)
CO2 SERPL-SCNC: 23 MMOL/L (ref 20–31)
CREAT SERPL-MCNC: 0.7 MG/DL (ref 0.5–0.9)
EOSINOPHIL # BLD: 0.03 K/UL (ref 0–0.44)
EOSINOPHILS RELATIVE PERCENT: 0 % (ref 1–4)
ERYTHROCYTE [DISTWIDTH] IN BLOOD BY AUTOMATED COUNT: 13.1 % (ref 11.8–14.4)
GFR, ESTIMATED: >90 ML/MIN/1.73M2
GLUCOSE SERPL-MCNC: 84 MG/DL (ref 70–99)
HCT VFR BLD AUTO: 33.4 % (ref 36.3–47.1)
HGB BLD-MCNC: 11.2 G/DL (ref 11.9–15.1)
IMM GRANULOCYTES # BLD AUTO: <0.03 K/UL (ref 0–0.3)
IMM GRANULOCYTES NFR BLD: 0 %
LYMPHOCYTES NFR BLD: 1.77 K/UL (ref 1.1–3.7)
LYMPHOCYTES RELATIVE PERCENT: 26 % (ref 24–43)
MCH RBC QN AUTO: 31.9 PG (ref 25.2–33.5)
MCHC RBC AUTO-ENTMCNC: 33.5 G/DL (ref 25.2–33.5)
MCV RBC AUTO: 95.2 FL (ref 82.6–102.9)
MONOCYTES NFR BLD: 0.58 K/UL (ref 0.1–1.2)
MONOCYTES NFR BLD: 9 % (ref 3–12)
NEUTROPHILS NFR BLD: 64 % (ref 36–65)
NEUTS SEG NFR BLD: 4.32 K/UL (ref 1.5–8.1)
NRBC BLD-RTO: 0 PER 100 WBC
PLATELET # BLD AUTO: 191 K/UL (ref 138–453)
PMV BLD AUTO: 10.2 FL (ref 8.1–13.5)
POTASSIUM SERPL-SCNC: 4.5 MMOL/L (ref 3.7–5.3)
RBC # BLD AUTO: 3.51 M/UL (ref 3.95–5.11)
SODIUM SERPL-SCNC: 136 MMOL/L (ref 135–144)
WBC OTHER # BLD: 6.8 K/UL (ref 3.5–11.3)

## 2025-03-25 PROCEDURE — 2060000000 HC ICU INTERMEDIATE R&B

## 2025-03-25 PROCEDURE — 2500000003 HC RX 250 WO HCPCS: Performed by: INTERNAL MEDICINE

## 2025-03-25 PROCEDURE — 36415 COLL VENOUS BLD VENIPUNCTURE: CPT

## 2025-03-25 PROCEDURE — 85025 COMPLETE CBC W/AUTO DIFF WBC: CPT

## 2025-03-25 PROCEDURE — 80048 BASIC METABOLIC PNL TOTAL CA: CPT

## 2025-03-25 PROCEDURE — 99222 1ST HOSP IP/OBS MODERATE 55: CPT | Performed by: SURGERY

## 2025-03-25 PROCEDURE — 2580000003 HC RX 258: Performed by: INTERNAL MEDICINE

## 2025-03-25 PROCEDURE — 6370000000 HC RX 637 (ALT 250 FOR IP): Performed by: INTERNAL MEDICINE

## 2025-03-25 PROCEDURE — 97165 OT EVAL LOW COMPLEX 30 MIN: CPT | Performed by: OCCUPATIONAL THERAPIST

## 2025-03-25 PROCEDURE — 82040 ASSAY OF SERUM ALBUMIN: CPT

## 2025-03-25 PROCEDURE — 97161 PT EVAL LOW COMPLEX 20 MIN: CPT

## 2025-03-25 PROCEDURE — 99231 SBSQ HOSP IP/OBS SF/LOW 25: CPT | Performed by: INTERNAL MEDICINE

## 2025-03-25 PROCEDURE — 6360000002 HC RX W HCPCS: Performed by: INTERNAL MEDICINE

## 2025-03-25 PROCEDURE — 6370000000 HC RX 637 (ALT 250 FOR IP): Performed by: NURSE PRACTITIONER

## 2025-03-25 RX ORDER — ZOLPIDEM TARTRATE 5 MG/1
10 TABLET ORAL NIGHTLY PRN
Status: DISCONTINUED | OUTPATIENT
Start: 2025-03-25 | End: 2025-03-27 | Stop reason: HOSPADM

## 2025-03-25 RX ORDER — OXYCODONE HYDROCHLORIDE 5 MG/1
5 TABLET ORAL 2 TIMES DAILY PRN
Refills: 0 | Status: DISCONTINUED | OUTPATIENT
Start: 2025-03-25 | End: 2025-03-27 | Stop reason: HOSPADM

## 2025-03-25 RX ORDER — GABAPENTIN 300 MG/1
300 CAPSULE ORAL NIGHTLY PRN
Status: DISCONTINUED | OUTPATIENT
Start: 2025-03-25 | End: 2025-03-27 | Stop reason: HOSPADM

## 2025-03-25 RX ORDER — GABAPENTIN 300 MG/1
300 CAPSULE ORAL 2 TIMES DAILY
Status: DISCONTINUED | OUTPATIENT
Start: 2025-03-25 | End: 2025-03-27 | Stop reason: HOSPADM

## 2025-03-25 RX ORDER — FLUTICASONE FUROATE AND VILANTEROL 200; 25 UG/1; UG/1
1 POWDER RESPIRATORY (INHALATION)
Status: DISCONTINUED | OUTPATIENT
Start: 2025-03-25 | End: 2025-03-27 | Stop reason: HOSPADM

## 2025-03-25 RX ADMIN — GABAPENTIN 300 MG: 300 CAPSULE ORAL at 21:50

## 2025-03-25 RX ADMIN — BUSPIRONE HYDROCHLORIDE 10 MG: 5 TABLET ORAL at 09:55

## 2025-03-25 RX ADMIN — SODIUM CHLORIDE: 0.9 INJECTION, SOLUTION INTRAVENOUS at 00:20

## 2025-03-25 RX ADMIN — HYDROMORPHONE HYDROCHLORIDE 1 MG: 1 INJECTION, SOLUTION INTRAMUSCULAR; INTRAVENOUS; SUBCUTANEOUS at 02:54

## 2025-03-25 RX ADMIN — SODIUM CHLORIDE: 0.9 INJECTION, SOLUTION INTRAVENOUS at 23:15

## 2025-03-25 RX ADMIN — LOSARTAN POTASSIUM 50 MG: 50 TABLET, FILM COATED ORAL at 09:55

## 2025-03-25 RX ADMIN — OXYCODONE HYDROCHLORIDE 5 MG: 5 TABLET ORAL at 23:37

## 2025-03-25 RX ADMIN — SODIUM CHLORIDE, PRESERVATIVE FREE 40 MG: 5 INJECTION INTRAVENOUS at 09:50

## 2025-03-25 RX ADMIN — ENOXAPARIN SODIUM 40 MG: 100 INJECTION SUBCUTANEOUS at 09:55

## 2025-03-25 RX ADMIN — ACETAMINOPHEN 650 MG: 325 TABLET ORAL at 08:00

## 2025-03-25 RX ADMIN — CALCIUM CHLORIDE 2000 MG: 100 INJECTION INTRAVENOUS; INTRAVENTRICULAR at 09:54

## 2025-03-25 RX ADMIN — ACETAMINOPHEN 650 MG: 325 TABLET ORAL at 16:45

## 2025-03-25 RX ADMIN — GABAPENTIN 300 MG: 300 CAPSULE ORAL at 23:33

## 2025-03-25 RX ADMIN — ACETAMINOPHEN 650 MG: 325 TABLET ORAL at 22:28

## 2025-03-25 RX ADMIN — SODIUM CHLORIDE: 0.9 INJECTION, SOLUTION INTRAVENOUS at 16:16

## 2025-03-25 RX ADMIN — PIPERACILLIN AND TAZOBACTAM 3375 MG: 3; .375 INJECTION, POWDER, LYOPHILIZED, FOR SOLUTION INTRAVENOUS at 06:15

## 2025-03-25 RX ADMIN — SODIUM CHLORIDE: 0.9 INJECTION, SOLUTION INTRAVENOUS at 06:24

## 2025-03-25 RX ADMIN — ESCITALOPRAM OXALATE 10 MG: 10 TABLET ORAL at 09:55

## 2025-03-25 RX ADMIN — PIPERACILLIN AND TAZOBACTAM 3375 MG: 3; .375 INJECTION, POWDER, LYOPHILIZED, FOR SOLUTION INTRAVENOUS at 14:20

## 2025-03-25 RX ADMIN — LEVOTHYROXINE SODIUM 100 MCG: 0.1 TABLET ORAL at 09:55

## 2025-03-25 RX ADMIN — PIPERACILLIN AND TAZOBACTAM 3375 MG: 3; .375 INJECTION, POWDER, LYOPHILIZED, FOR SOLUTION INTRAVENOUS at 22:00

## 2025-03-25 ASSESSMENT — PAIN DESCRIPTION - DESCRIPTORS
DESCRIPTORS: DISCOMFORT;ACHING
DESCRIPTORS: ACHING;DISCOMFORT
DESCRIPTORS: ACHING
DESCRIPTORS: DISCOMFORT
DESCRIPTORS: DISCOMFORT

## 2025-03-25 ASSESSMENT — PAIN SCALES - GENERAL
PAINLEVEL_OUTOF10: 4
PAINLEVEL_OUTOF10: 5
PAINLEVEL_OUTOF10: 7
PAINLEVEL_OUTOF10: 6
PAINLEVEL_OUTOF10: 7
PAINLEVEL_OUTOF10: 6
PAINLEVEL_OUTOF10: 6

## 2025-03-25 ASSESSMENT — PAIN - FUNCTIONAL ASSESSMENT: PAIN_FUNCTIONAL_ASSESSMENT: ACTIVITIES ARE NOT PREVENTED

## 2025-03-25 ASSESSMENT — PAIN DESCRIPTION - ORIENTATION
ORIENTATION: MID
ORIENTATION: LOWER

## 2025-03-25 ASSESSMENT — PAIN DESCRIPTION - LOCATION
LOCATION: HEAD
LOCATION: ABDOMEN;HEAD
LOCATION: ABDOMEN

## 2025-03-25 NOTE — CONSULTS
General Surgery   Consultation        PATIENT NAME: Yaz Torres   YOB: 1955    ADMISSION DATE: 3/24/2025 11:20 AM     Admitting Provider: Farnaz    Consulted Physician: Noam     TODAY'S DATE: 3/25/2025    Consult Regarding:  Acute diverticulitis    HISTORY OF PRESENT ILLNESS:  The patient is a 70 y.o. female  who presents with complaints of 1 to 2-day history of lower abdominal pain.  Patient reports over the weekend she began to have a little nausea which was her initial symptom.  That improved but then soon after began to develop pain in the lower abdomen.  Because of the pain she decided come to ER for evaluation.  Patient denies any prior similar pain symptoms.  Prior to the weekend was in her usual state of health.  Denies any emesis.  Does report that she has noticed a decrease in her bowel movements.  Still having them but less frequently.  Still passing flatus.  No blood per rectum.  Reports that her last colonoscopy was 7 to 8 years ago.  In the ER she had workup which included labs and imaging.  Her lab work was largely unremarkable with no leukocytosis.  CT scan of the abdomen pelvis did show acute uncomplicated diverticulitis and some mild peritonitis in the lower abdomen.    Past Medical History:        Diagnosis Date    Acne rosacea     Anxiety     CMV infection (HCC)     chronic    Gluten intolerance     Heart murmur     Hypertension     Hypoglycemia     Hypothyroidism     Other disorders of kidney and ureter in diseases classified elsewhere     Palpitations     Pneumonia        Past Surgical History:        Procedure Laterality Date    BACK SURGERY      COLONOSCOPY      HYSTERECTOMY (CERVIX STATUS UNKNOWN)      JOINT REPLACEMENT Bilateral     hips    LASIK  05/18/1999       Medications Prior to Admission:   Medications Prior to Admission: oxyCODONE (ROXICODONE) 5 MG immediate release tablet, Take 1 tablet by mouth in the morning and at bedtime.  fluticasone furoate-vilanterol

## 2025-03-25 NOTE — CARE COORDINATION
Case Management Assessment  Initial Evaluation    Date/Time of Evaluation: 3/25/2025 2:44 PM  Assessment Completed by: Jess Puga RN    If patient is discharged prior to next notation, then this note serves as note for discharge by case management.    Patient Name: Yaz Torres                   YOB: 1955  Diagnosis: Acute diverticulitis [K57.92]                   Date / Time: 3/24/2025 11:20 AM    Patient Admission Status: Inpatient   Readmission Risk (Low < 19, Mod (19-27), High > 27): Readmission Risk Score: 7.3    Current PCP: Wyatt Viramontes APRN - CNP  PCP verified by CM? Yes    Chart Reviewed: Yes      History Provided by: Patient  Patient Orientation: Alert and Oriented    Patient Cognition: Alert    Hospitalization in the last 30 days (Readmission):  No    If yes, Readmission Assessment in CM Navigator will be completed.    Advance Directives:      Code Status: Full Code   Patient's Primary Decision Maker is:        Discharge Planning:    Patient lives with: Spouse/Significant Other Type of Home: House  Primary Care Giver: Self  Patient Support Systems include: Spouse/Significant Other   Current Financial resources: Medicare  Current community resources: None  Current services prior to admission: None            Current DME:              Type of Home Care services:  None    ADLS  Prior functional level: Independent in ADLs/IADLs  Current functional level: Independent in ADLs/IADLs    PT AM-PAC:   /24  OT AM-PAC:   /24    Family can provide assistance at DC: Yes  Would you like Case Management to discuss the discharge plan with any other family members/significant others, and if so, who? No  Plans to Return to Present Housing: Yes  Other Identified Issues/Barriers to RETURNING to current housing: yes  Potential Assistance needed at discharge: N/A            Potential DME:    Patient expects to discharge to: House  Plan for transportation at discharge:      Financial    Payor: MEDICARE

## 2025-03-25 NOTE — CARE COORDINATION
DISCHARGE BARRIERS       Reason for Referral:  SW completed a Psychosocial Assessment for evaluation of patient's mental health, social status, and functional capacity within the community. Ana Patino is a 70 y.o. female admitted due to Acute diverticulitis. Patient alone. SW provided supportive listening while patient discussed past medical history and events leading up to hospitalization.       Mental Status:  Alert, oriented, and engaging during assessment.     Decision Making:  Makes own decisions.     Family/Social/Home Environment: lives with their spouse    Employment status: Retired     Health Insurance:     Active Insurance as of 3/24/2025       Primary Coverage       Payor Plan Insurance Group Employer/Plan Group    MEDICARE MEDICARE PART A AND B        Payor Address Payor Phone Number Payor Fax Number Effective Dates    [address] [address] [phone]  3/1/2020 - None Entered    [facility] [id number]         Subscriber Name Subscriber Birth Date Member ID       ANA PATINO 1955 [id number]               Secondary Coverage       Payor Plan Insurance Group Employer/Plan Group    [employer] AARP HEALTH CARE MEDICARE SUPP        Payor Plan Address Payor Plan Phone Number Payor Plan Fax Number Effective Dates    [address] [phone]  3/1/2025 - None Entered    [facility] [id number]         Subscriber Name Subscriber Birth Date Member ID       ANA PATINO 1955 [id number]                     Support: Discussed a good social support network     Current Services:  None      Current DMEs: none reported     PCP: Wyatt Viramontes APRN - CNP and repots no issues affording medication.      status:   No     ADLs and means of transportation: Independent  in ADLs prior to hospitalization and able to transport self.    Food insecurity or needed financial assistance: Denies any food insecurity or financial concerns at this time.    Income Source: social security    ACP and Code Status:  ROB

## 2025-03-26 ENCOUNTER — APPOINTMENT (OUTPATIENT)
Dept: GENERAL RADIOLOGY | Age: 70
DRG: 391 | End: 2025-03-26
Payer: MEDICARE

## 2025-03-26 LAB
ANION GAP SERPL CALCULATED.3IONS-SCNC: 7 MMOL/L (ref 9–16)
BASOPHILS # BLD: <0.03 K/UL (ref 0–0.2)
BASOPHILS NFR BLD: 1 % (ref 0–2)
BUN SERPL-MCNC: 4 MG/DL (ref 8–23)
CALCIUM SERPL-MCNC: 8.6 MG/DL (ref 8.6–10.4)
CHLORIDE SERPL-SCNC: 112 MMOL/L (ref 98–107)
CO2 SERPL-SCNC: 22 MMOL/L (ref 20–31)
CREAT SERPL-MCNC: 0.6 MG/DL (ref 0.6–0.9)
EOSINOPHIL # BLD: 0.12 K/UL (ref 0–0.44)
EOSINOPHILS RELATIVE PERCENT: 3 % (ref 1–4)
ERYTHROCYTE [DISTWIDTH] IN BLOOD BY AUTOMATED COUNT: 12.7 % (ref 11.8–14.4)
GFR, ESTIMATED: >90 ML/MIN/1.73M2
GLUCOSE SERPL-MCNC: 70 MG/DL (ref 74–99)
HCT VFR BLD AUTO: 30.6 % (ref 36.3–47.1)
HGB BLD-MCNC: 10.2 G/DL (ref 11.9–15.1)
IMM GRANULOCYTES # BLD AUTO: <0.03 K/UL (ref 0–0.3)
IMM GRANULOCYTES NFR BLD: 0 %
LYMPHOCYTES NFR BLD: 1.58 K/UL (ref 1.1–3.7)
LYMPHOCYTES RELATIVE PERCENT: 38 % (ref 24–43)
MAGNESIUM SERPL-MCNC: 1.9 MG/DL (ref 1.6–2.4)
MCH RBC QN AUTO: 31.2 PG (ref 25.2–33.5)
MCHC RBC AUTO-ENTMCNC: 33.3 G/DL (ref 25.2–33.5)
MCV RBC AUTO: 93.6 FL (ref 82.6–102.9)
MONOCYTES NFR BLD: 0.36 K/UL (ref 0.1–1.2)
MONOCYTES NFR BLD: 9 % (ref 3–12)
NEUTROPHILS NFR BLD: 49 % (ref 36–65)
NEUTS SEG NFR BLD: 2.04 K/UL (ref 1.5–8.1)
NRBC BLD-RTO: 0 PER 100 WBC
PLATELET # BLD AUTO: 168 K/UL (ref 138–453)
PMV BLD AUTO: 10.8 FL (ref 8.1–13.5)
POTASSIUM SERPL-SCNC: 3.9 MMOL/L (ref 3.7–5.3)
RBC # BLD AUTO: 3.27 M/UL (ref 3.95–5.11)
SODIUM SERPL-SCNC: 141 MMOL/L (ref 136–145)
WBC OTHER # BLD: 4.1 K/UL (ref 3.5–11.3)

## 2025-03-26 PROCEDURE — 83735 ASSAY OF MAGNESIUM: CPT

## 2025-03-26 PROCEDURE — 97530 THERAPEUTIC ACTIVITIES: CPT

## 2025-03-26 PROCEDURE — 85025 COMPLETE CBC W/AUTO DIFF WBC: CPT

## 2025-03-26 PROCEDURE — 6370000000 HC RX 637 (ALT 250 FOR IP): Performed by: NURSE PRACTITIONER

## 2025-03-26 PROCEDURE — 6360000002 HC RX W HCPCS: Performed by: INTERNAL MEDICINE

## 2025-03-26 PROCEDURE — 99231 SBSQ HOSP IP/OBS SF/LOW 25: CPT | Performed by: INTERNAL MEDICINE

## 2025-03-26 PROCEDURE — 2500000003 HC RX 250 WO HCPCS: Performed by: INTERNAL MEDICINE

## 2025-03-26 PROCEDURE — 6370000000 HC RX 637 (ALT 250 FOR IP): Performed by: INTERNAL MEDICINE

## 2025-03-26 PROCEDURE — 36415 COLL VENOUS BLD VENIPUNCTURE: CPT

## 2025-03-26 PROCEDURE — 74022 RADEX COMPL AQT ABD SERIES: CPT

## 2025-03-26 PROCEDURE — 80048 BASIC METABOLIC PNL TOTAL CA: CPT

## 2025-03-26 PROCEDURE — 2060000000 HC ICU INTERMEDIATE R&B

## 2025-03-26 PROCEDURE — 94760 N-INVAS EAR/PLS OXIMETRY 1: CPT

## 2025-03-26 PROCEDURE — 2580000003 HC RX 258: Performed by: INTERNAL MEDICINE

## 2025-03-26 RX ADMIN — ESCITALOPRAM OXALATE 10 MG: 10 TABLET ORAL at 08:53

## 2025-03-26 RX ADMIN — ENOXAPARIN SODIUM 40 MG: 100 INJECTION SUBCUTANEOUS at 08:54

## 2025-03-26 RX ADMIN — GABAPENTIN 300 MG: 300 CAPSULE ORAL at 21:47

## 2025-03-26 RX ADMIN — SODIUM CHLORIDE: 0.9 INJECTION, SOLUTION INTRAVENOUS at 13:21

## 2025-03-26 RX ADMIN — ACETAMINOPHEN 650 MG: 325 TABLET ORAL at 10:27

## 2025-03-26 RX ADMIN — PIPERACILLIN AND TAZOBACTAM 3375 MG: 3; .375 INJECTION, POWDER, LYOPHILIZED, FOR SOLUTION INTRAVENOUS at 13:57

## 2025-03-26 RX ADMIN — OXYCODONE HYDROCHLORIDE 5 MG: 5 TABLET ORAL at 22:51

## 2025-03-26 RX ADMIN — SODIUM CHLORIDE: 0.9 INJECTION, SOLUTION INTRAVENOUS at 06:00

## 2025-03-26 RX ADMIN — PIPERACILLIN AND TAZOBACTAM 3375 MG: 3; .375 INJECTION, POWDER, LYOPHILIZED, FOR SOLUTION INTRAVENOUS at 21:49

## 2025-03-26 RX ADMIN — LEVOTHYROXINE SODIUM 100 MCG: 0.1 TABLET ORAL at 08:54

## 2025-03-26 RX ADMIN — GABAPENTIN 300 MG: 300 CAPSULE ORAL at 22:50

## 2025-03-26 RX ADMIN — ACETAMINOPHEN 650 MG: 325 TABLET ORAL at 17:17

## 2025-03-26 RX ADMIN — BUSPIRONE HYDROCHLORIDE 10 MG: 5 TABLET ORAL at 08:54

## 2025-03-26 RX ADMIN — PIPERACILLIN AND TAZOBACTAM 3375 MG: 3; .375 INJECTION, POWDER, LYOPHILIZED, FOR SOLUTION INTRAVENOUS at 06:34

## 2025-03-26 RX ADMIN — LOSARTAN POTASSIUM 50 MG: 50 TABLET, FILM COATED ORAL at 08:54

## 2025-03-26 RX ADMIN — SODIUM CHLORIDE, PRESERVATIVE FREE 40 MG: 5 INJECTION INTRAVENOUS at 08:54

## 2025-03-26 ASSESSMENT — PAIN SCALES - WONG BAKER
WONGBAKER_NUMERICALRESPONSE: HURTS A LITTLE BIT
WONGBAKER_NUMERICALRESPONSE: HURTS A LITTLE BIT
WONGBAKER_NUMERICALRESPONSE: NO HURT

## 2025-03-26 ASSESSMENT — PAIN DESCRIPTION - LOCATION
LOCATION: ABDOMEN

## 2025-03-26 ASSESSMENT — PAIN DESCRIPTION - DESCRIPTORS: DESCRIPTORS: DISCOMFORT

## 2025-03-26 ASSESSMENT — PAIN - FUNCTIONAL ASSESSMENT: PAIN_FUNCTIONAL_ASSESSMENT: ACTIVITIES ARE NOT PREVENTED

## 2025-03-26 ASSESSMENT — PAIN DESCRIPTION - ORIENTATION: ORIENTATION: LOWER

## 2025-03-26 ASSESSMENT — PAIN SCALES - GENERAL
PAINLEVEL_OUTOF10: 6
PAINLEVEL_OUTOF10: 3

## 2025-03-26 NOTE — PLAN OF CARE
Problem: Discharge Planning  Goal: Discharge to home or other facility with appropriate resources  3/26/2025 0758 by Shock, Rylee, RN  Outcome: Progressing  3/26/2025 0451 by Lashell Ho RN  Outcome: Progressing     Problem: Pain  Goal: Verbalizes/displays adequate comfort level or baseline comfort level  3/26/2025 0758 by Shock, Rylee, RN  Outcome: Progressing  3/26/2025 0451 by Lashell Ho RN  Outcome: Progressing     Problem: ABCDS Injury Assessment  Goal: Absence of physical injury  3/26/2025 0758 by Shock, Rylee, RN  Outcome: Progressing  3/26/2025 0451 by Lashell Ho RN  Outcome: Progressing     Problem: Metabolic/Fluid and Electrolytes - Adult  Goal: Electrolytes maintained within normal limits  3/26/2025 0758 by Shock, Rylee, RN  Outcome: Progressing  3/26/2025 0451 by Lashell Ho RN  Outcome: Progressing  Goal: Hemodynamic stability and optimal renal function maintained  3/26/2025 0758 by Shock, Rylee, RN  Outcome: Progressing  3/26/2025 0451 by Lashell Ho RN  Outcome: Progressing  Goal: Glucose maintained within prescribed range  3/26/2025 0758 by Shock, Rylee, RN  Outcome: Progressing  3/26/2025 0451 by Lashell Ho RN  Outcome: Progressing     Problem: Gastrointestinal - Adult  Goal: Minimal or absence of nausea and vomiting  3/26/2025 0758 by Shock, Rylee, RN  Outcome: Progressing  3/26/2025 0451 by Lashell Ho RN  Outcome: Progressing  Goal: Maintains or returns to baseline bowel function  3/26/2025 0758 by Shock, Rylee, RN  Outcome: Progressing  3/26/2025 0451 by Lashell Ho RN  Outcome: Progressing  Goal: Maintains adequate nutritional intake  3/26/2025 0758 by Shock, Rylee, RN  Outcome: Progressing  3/26/2025 0451 by Lashell Ho RN  Outcome: Progressing  Goal: Establish and maintain optimal ostomy function  3/26/2025 0758 by Shock, Rylee, RN  Outcome: Progressing  3/26/2025 0451 by Lashell Ho RN  Outcome:

## 2025-03-26 NOTE — PLAN OF CARE
Problem: Discharge Planning  Goal: Discharge to home or other facility with appropriate resources  Outcome: Progressing     Problem: Pain  Goal: Verbalizes/displays adequate comfort level or baseline comfort level  Outcome: Progressing     Problem: ABCDS Injury Assessment  Goal: Absence of physical injury  Outcome: Progressing     Problem: Metabolic/Fluid and Electrolytes - Adult  Goal: Electrolytes maintained within normal limits  Outcome: Progressing  Goal: Hemodynamic stability and optimal renal function maintained  Outcome: Progressing  Goal: Glucose maintained within prescribed range  Outcome: Progressing     Problem: Gastrointestinal - Adult  Goal: Minimal or absence of nausea and vomiting  Outcome: Progressing  Goal: Maintains or returns to baseline bowel function  Outcome: Progressing  Goal: Maintains adequate nutritional intake  Outcome: Progressing  Goal: Establish and maintain optimal ostomy function  Outcome: Progressing     Problem: Safety - Adult  Goal: Free from fall injury  Outcome: Progressing

## 2025-03-26 NOTE — CARE COORDINATION
03/26/25 1043   IMM Letter   IMM Letter given to Patient/Family/Significant other/Guardian/POA/by: Second IMM given to pt by AMRIT Puga RN   IMM Letter date given: 03/26/25   IMM Letter time given: 1043     IMM letter provided to patient.  Patient offered four hours to make informed decision regarding appeal process; patient agreeable to discharge.

## 2025-03-27 ENCOUNTER — APPOINTMENT (OUTPATIENT)
Dept: CT IMAGING | Age: 70
DRG: 391 | End: 2025-03-27
Payer: MEDICARE

## 2025-03-27 VITALS
TEMPERATURE: 98 F | SYSTOLIC BLOOD PRESSURE: 146 MMHG | HEART RATE: 56 BPM | DIASTOLIC BLOOD PRESSURE: 68 MMHG | BODY MASS INDEX: 32.24 KG/M2 | RESPIRATION RATE: 16 BRPM | OXYGEN SATURATION: 98 % | HEIGHT: 62 IN | WEIGHT: 175.2 LBS

## 2025-03-27 LAB
ANION GAP SERPL CALCULATED.3IONS-SCNC: 9 MMOL/L (ref 9–17)
BASOPHILS # BLD: 0.03 K/UL (ref 0–0.2)
BASOPHILS NFR BLD: 1 % (ref 0–2)
BUN SERPL-MCNC: 2 MG/DL (ref 8–23)
BUN/CREAT SERPL: 4 (ref 9–20)
CALCIUM SERPL-MCNC: 8.2 MG/DL (ref 8.6–10.4)
CHLORIDE SERPL-SCNC: 109 MMOL/L (ref 98–107)
CO2 SERPL-SCNC: 24 MMOL/L (ref 20–31)
CREAT SERPL-MCNC: 0.5 MG/DL (ref 0.5–0.9)
EOSINOPHIL # BLD: 0.11 K/UL (ref 0–0.44)
EOSINOPHILS RELATIVE PERCENT: 3 % (ref 1–4)
ERYTHROCYTE [DISTWIDTH] IN BLOOD BY AUTOMATED COUNT: 12.6 % (ref 11.8–14.4)
GFR, ESTIMATED: >90 ML/MIN/1.73M2
GLUCOSE SERPL-MCNC: 82 MG/DL (ref 70–99)
HCT VFR BLD AUTO: 32.2 % (ref 36.3–47.1)
HGB BLD-MCNC: 11.1 G/DL (ref 11.9–15.1)
IMM GRANULOCYTES # BLD AUTO: <0.03 K/UL (ref 0–0.3)
IMM GRANULOCYTES NFR BLD: 1 %
LYMPHOCYTES NFR BLD: 1.3 K/UL (ref 1.1–3.7)
LYMPHOCYTES RELATIVE PERCENT: 31 % (ref 24–43)
MAGNESIUM SERPL-MCNC: 1.7 MG/DL (ref 1.6–2.6)
MCH RBC QN AUTO: 31.4 PG (ref 25.2–33.5)
MCHC RBC AUTO-ENTMCNC: 34.5 G/DL (ref 25.2–33.5)
MCV RBC AUTO: 91 FL (ref 82.6–102.9)
MONOCYTES NFR BLD: 0.34 K/UL (ref 0.1–1.2)
MONOCYTES NFR BLD: 8 % (ref 3–12)
NEUTROPHILS NFR BLD: 56 % (ref 36–65)
NEUTS SEG NFR BLD: 2.37 K/UL (ref 1.5–8.1)
NRBC BLD-RTO: 0 PER 100 WBC
PLATELET # BLD AUTO: 204 K/UL (ref 138–453)
PMV BLD AUTO: 10.4 FL (ref 8.1–13.5)
POTASSIUM SERPL-SCNC: 3.6 MMOL/L (ref 3.7–5.3)
RBC # BLD AUTO: 3.54 M/UL (ref 3.95–5.11)
SODIUM SERPL-SCNC: 142 MMOL/L (ref 135–144)
WBC OTHER # BLD: 4.2 K/UL (ref 3.5–11.3)

## 2025-03-27 PROCEDURE — 6360000002 HC RX W HCPCS: Performed by: INTERNAL MEDICINE

## 2025-03-27 PROCEDURE — 6370000000 HC RX 637 (ALT 250 FOR IP): Performed by: NURSE PRACTITIONER

## 2025-03-27 PROCEDURE — 99238 HOSP IP/OBS DSCHRG MGMT 30/<: CPT | Performed by: INTERNAL MEDICINE

## 2025-03-27 PROCEDURE — 83735 ASSAY OF MAGNESIUM: CPT

## 2025-03-27 PROCEDURE — 6370000000 HC RX 637 (ALT 250 FOR IP): Performed by: INTERNAL MEDICINE

## 2025-03-27 PROCEDURE — 2580000003 HC RX 258: Performed by: INTERNAL MEDICINE

## 2025-03-27 PROCEDURE — 2709999900 CT ABDOMEN PELVIS W IV CONTRAST

## 2025-03-27 PROCEDURE — 6360000004 HC RX CONTRAST MEDICATION: Performed by: INTERNAL MEDICINE

## 2025-03-27 PROCEDURE — 85025 COMPLETE CBC W/AUTO DIFF WBC: CPT

## 2025-03-27 PROCEDURE — 36415 COLL VENOUS BLD VENIPUNCTURE: CPT

## 2025-03-27 PROCEDURE — 80048 BASIC METABOLIC PNL TOTAL CA: CPT

## 2025-03-27 RX ORDER — IOPAMIDOL 755 MG/ML
100 INJECTION, SOLUTION INTRAVASCULAR
Status: COMPLETED | OUTPATIENT
Start: 2025-03-27 | End: 2025-03-27

## 2025-03-27 RX ORDER — POTASSIUM CHLORIDE 1500 MG/1
20 TABLET, EXTENDED RELEASE ORAL ONCE
Status: COMPLETED | OUTPATIENT
Start: 2025-03-27 | End: 2025-03-27

## 2025-03-27 RX ORDER — PANTOPRAZOLE SODIUM 40 MG/1
40 TABLET, DELAYED RELEASE ORAL
Status: DISCONTINUED | OUTPATIENT
Start: 2025-03-28 | End: 2025-03-27 | Stop reason: HOSPADM

## 2025-03-27 RX ADMIN — SODIUM CHLORIDE, PRESERVATIVE FREE 40 MG: 5 INJECTION INTRAVENOUS at 07:35

## 2025-03-27 RX ADMIN — ACETAMINOPHEN 650 MG: 325 TABLET ORAL at 07:33

## 2025-03-27 RX ADMIN — LEVOTHYROXINE SODIUM 100 MCG: 0.1 TABLET ORAL at 07:36

## 2025-03-27 RX ADMIN — ENOXAPARIN SODIUM 40 MG: 100 INJECTION SUBCUTANEOUS at 07:35

## 2025-03-27 RX ADMIN — PIPERACILLIN AND TAZOBACTAM 3375 MG: 3; .375 INJECTION, POWDER, LYOPHILIZED, FOR SOLUTION INTRAVENOUS at 05:33

## 2025-03-27 RX ADMIN — POTASSIUM CHLORIDE 20 MEQ: 1500 TABLET, EXTENDED RELEASE ORAL at 07:35

## 2025-03-27 RX ADMIN — LOSARTAN POTASSIUM 50 MG: 50 TABLET, FILM COATED ORAL at 07:35

## 2025-03-27 RX ADMIN — ESCITALOPRAM OXALATE 10 MG: 10 TABLET ORAL at 07:35

## 2025-03-27 RX ADMIN — BUSPIRONE HYDROCHLORIDE 10 MG: 5 TABLET ORAL at 07:34

## 2025-03-27 RX ADMIN — SODIUM CHLORIDE: 0.9 INJECTION, SOLUTION INTRAVENOUS at 01:52

## 2025-03-27 RX ADMIN — IOPAMIDOL 100 ML: 755 INJECTION, SOLUTION INTRAVENOUS at 08:39

## 2025-03-27 ASSESSMENT — PAIN DESCRIPTION - DESCRIPTORS: DESCRIPTORS: ACHING

## 2025-03-27 ASSESSMENT — PAIN DESCRIPTION - LOCATION: LOCATION: HIP;BACK

## 2025-03-27 ASSESSMENT — PAIN DESCRIPTION - ORIENTATION: ORIENTATION: RIGHT;LEFT

## 2025-03-27 ASSESSMENT — PAIN SCALES - GENERAL: PAINLEVEL_OUTOF10: 7

## 2025-03-27 NOTE — PROGRESS NOTES
CLINICAL PHARMACY NOTE: MEDS TO BEDS    Total # of Prescriptions Filled: 1   The following medications were delivered to the patient:  Augmentin 875    Additional Documentation:   
Hospitalist Progress Note    Patient:  Yaz Torres     YOB: 1955    MRN: [id number]   Admit date: 3/24/2025     Acct: [id number]     PCP: Wyatt Viramontes APRN - CNP    CC--Interval History: Diverticulitis--sigmoid colon---improved---see subnote below for imaging studies.    Home---3.27.2025----CLD and advance to low fiber----Augmentin--recommend probiotics    HTN----146/68    Asthma--bronchiectasis----stable    CKD--Stage 1    K = 3.6 ---> potassium replacement    Hyponatremia---corrected    See note below    All other ROS negative except noted in HPI    Diet:  ADULT DIET; Clear Liquid; Gluten Free    Medications:  Scheduled Meds:   fluticasone furoate-vilanterol  1 puff Inhalation Daily RT    gabapentin  300 mg Oral BID    busPIRone  10 mg Oral Daily    escitalopram  10 mg Oral Daily    losartan  50 mg Oral Daily    levothyroxine  100 mcg Oral Daily    piperacillin-tazobactam  3,375 mg IntraVENous Q8H    pantoprazole (PROTONIX) 40 mg in sodium chloride (PF) 0.9 % 10 mL injection  40 mg IntraVENous Daily    sodium chloride flush  10 mL IntraVENous 2 times per day    enoxaparin  40 mg SubCUTAneous Daily     Continuous Infusions:   sodium chloride 50 mL/hr at 03/27/25 0537    sodium chloride       PRN Meds:HYDROmorphone, oxyCODONE, zolpidem, gabapentin, sodium chloride flush, sodium chloride, acetaminophen, sodium chloride nebulizer, albuterol, ondansetron    Objective:  Labs:  CBC with Differential:    Lab Results   Component Value Date/Time    WBC 4.2 03/27/2025 05:29 AM    RBC 3.54 03/27/2025 05:29 AM    HGB 11.1 03/27/2025 05:29 AM    HCT 32.2 03/27/2025 05:29 AM     03/27/2025 05:29 AM    MCV 91.0 03/27/2025 05:29 AM    MCH 31.4 03/27/2025 05:29 AM    MCHC 34.5 03/27/2025 05:29 AM    RDW 12.6 03/27/2025 05:29 AM    LYMPHOPCT 31 03/27/2025 05:29 AM    MONOPCT 8 03/27/2025 05:29 AM    EOSPCT 3 03/27/2025 05:29 AM    BASOPCT 1 03/27/2025 05:29 AM    MONOSABS 0.34 03/27/2025 05:29 AM    
Hospitalist Progress Note    Patient:  Yaz Torres     YOB: 1955    MRN: [id number]   Admit date: 3/24/2025     Acct: [id number]     PCP: Chon Brown MD--FP--[facility]    CC--Interval History:    Sigmoid diverticulitis---tried to advance diet to soft---not tolerated--fullness--gas--[+]--no BM---AAS ordered and returned to CLD----Zosyn cont'd.    HTN----123/64    Asthma--bronchiectasis---controlled.    See note below     All other ROS negative except noted in HPI    Diet:  ADULT DIET; Dysphagia - Soft and Bite Sized; Gluten Free    Medications:  Scheduled Meds:   fluticasone furoate-vilanterol  1 puff Inhalation Daily RT    gabapentin  300 mg Oral BID    busPIRone  10 mg Oral Daily    escitalopram  10 mg Oral Daily    losartan  50 mg Oral Daily    levothyroxine  100 mcg Oral Daily    piperacillin-tazobactam  3,375 mg IntraVENous Q8H    pantoprazole (PROTONIX) 40 mg in sodium chloride (PF) 0.9 % 10 mL injection  40 mg IntraVENous Daily    sodium chloride flush  10 mL IntraVENous 2 times per day    enoxaparin  40 mg SubCUTAneous Daily     Continuous Infusions:   sodium chloride 150 mL/hr at 03/26/25 0642    sodium chloride       PRN Meds:HYDROmorphone, oxyCODONE, zolpidem, gabapentin, sodium chloride flush, sodium chloride, acetaminophen, sodium chloride nebulizer, albuterol, ondansetron    Objective:  Labs:  CBC with Differential:    Lab Results   Component Value Date/Time    WBC 4.1 03/26/2025 05:06 AM    RBC 3.27 03/26/2025 05:06 AM    HGB 10.2 03/26/2025 05:06 AM    HCT 30.6 03/26/2025 05:06 AM     03/26/2025 05:06 AM    MCV 93.6 03/26/2025 05:06 AM    MCH 31.2 03/26/2025 05:06 AM    MCHC 33.3 03/26/2025 05:06 AM    RDW 12.7 03/26/2025 05:06 AM    LYMPHOPCT 38 03/26/2025 05:06 AM    MONOPCT 9 03/26/2025 05:06 AM    EOSPCT 3 03/26/2025 05:06 AM    BASOPCT 1 03/26/2025 05:06 AM    MONOSABS 0.36 03/26/2025 05:06 AM    LYMPHSABS 1.58 03/26/2025 05:06 AM    EOSABS 0.12 03/26/2025 05:06 AM 
Occupational Therapy  Facility/Department: [facility]  PROGRESSIVE CARE  Occupational Therapy Initial Assessment    Name: Yaz Torres  : 1955  MRN: [id number]  Date of Service: 3/25/2025    Discharge Recommendations:  Home with assist PRN     Patient Diagnosis(es): The encounter diagnosis was Acute diverticulitis.  Past Medical History:  has a past medical history of Acne rosacea, Anxiety, CMV infection (HCC), Gluten intolerance, Heart murmur, Hypertension, Hypoglycemia, Hypothyroidism, Other disorders of kidney and ureter in diseases classified elsewhere, Palpitations, and Pneumonia.  Past Surgical History:  has a past surgical history that includes LASIK (1999); Hysterectomy; back surgery; Colonoscopy; and joint replacement (Bilateral).    Treatment Diagnosis: general weakness      Assessment  Performance deficits / Impairments: Decreased endurance;Decreased high-level IADLs  Treatment Diagnosis: general weakness  Prognosis: Good  Decision Making: Low Complexity        Plan  Occupational Therapy Plan  Times Per Week: 1x  Current Treatment Recommendations: Equipment evaluation, education, & procurement, Patient/Caregiver education & training    Subjective  General  Chart Reviewed: Yes  Patient assessed for rehabilitation services?: Yes  Family / Caregiver Present: No  Referring Practitioner: CHONG Osei  Diagnosis: acute diverticulitis     Social/Functional History  Social/Functional History  Lives With: Spouse  Type of Home: House  Home Layout: One level  Home Access: Stairs to enter without rails  Entrance Stairs - Number of Steps: 2  Bathroom Shower/Tub: Walk-in shower  Bathroom Toilet: Handicap height  Bathroom Accessibility: Accessible  Home Equipment: Reacher (has access to cane and walker at home)  Prior Level of Assist for ADLs: Independent  Prior Level of Assist for Homemaking: Independent  Homemaking Responsibilities: Yes  Meal Prep Responsibility: Secondary  Laundry Responsibility: Primary  Cleaning 
Occupational Therapy  Facility/Department: [facility]  PROGRESSIVE CARE  Daily Treatment Note  NAME: Yaz Torres  : 1955  MRN: [id number]    Date of Service: 3/26/2025    Discharge Recommendations:  Home with assist PRN  OT Equipment Recommendations  Equipment Needed: Yes  Mobility Devices: ADL Assistive Devices      Patient Diagnosis(es): The encounter diagnosis was Acute diverticulitis.     Assessment   Activity Tolerance: Patient tolerated treatment well  Discharge Recommendations: Home with assist PRN  Equipment Needed: Yes  Mobility Devices: ADL Assistive Devices     Plan  Occupational Therapy Plan  Times Per Week: 1x  Current Treatment Recommendations: Equipment evaluation, education, & procurement;Patient/Caregiver education & training    Restrictions       Subjective  Subjective  Subjective: Patient met supine in bed, agreeable and pleasant for OT.  Orientation  Overall Orientation Status: Within Normal Limits  Pain: Denies at rest  Cognition  Overall Cognitive Status: WNL       Objective  Vitals  Vitals  O2 Device: None (Room air)                 Safety Devices  Type of Devices: Left in bed;Bed alarm in place;Call light within reach;Nurse notified    Patient Education  Education Given To: Patient  Education Provided: Role of Therapy;ADL Adaptive Strategies;Energy Conservation;Equipment  Education Provided Comments: Education and handouts provided regarding use of various pieces of AE for ease in LB ADLs. Also educated on EC/WS strategies regarding protection of smaller joints with hands/wrists.  Education Method: Verbal;Printed Information/Hand-outs  Barriers to Learning: None  Education Outcome: Verbalized understanding    Goals  Short Term Goals  Time Frame for Short Term Goals: LOS  Short Term Goal 1: Patient education adaptive equipment and technique LB dressing for increased ease ADLs  Short Term Goal 2: Patient education adaptive equipment and technique; and EC/WS B hands for increased ease I/ADLs - 
Physical Therapy  Facility/Department: [facility]  PROGRESSIVE CARE  Physical Therapy Initial Assessment    Name: Yaz Torres  : 1955  MRN: [id number]  Date of Service: 3/25/2025    Discharge Recommendations:  Home with assist PRN          Patient Diagnosis(es): The encounter diagnosis was Acute diverticulitis.  Past Medical History:  has a past medical history of Acne rosacea, Anxiety, CMV infection (HCC), Gluten intolerance, Heart murmur, Hypertension, Hypoglycemia, Hypothyroidism, Other disorders of kidney and ureter in diseases classified elsewhere, Palpitations, and Pneumonia.  Past Surgical History:  has a past surgical history that includes LASIK (1999); Hysterectomy; back surgery; Colonoscopy; and joint replacement (Bilateral).    Assessment  Therapy Prognosis: Good  Decision Making: Low Complexity  No Skilled PT: Independent with functional mobility   Activity Tolerance  Activity Tolerance: Patient tolerated evaluation without incident    Plan  Safety Devices  Type of Devices: Call light within reach, Bed alarm in place, Left in bed      Subjective  General  Patient assessed for rehabilitation services?: Yes         Social/Functional History  Social/Functional History  Lives With: Spouse  Type of Home: House  Home Layout: One level  Home Access: Stairs to enter without rails  Entrance Stairs - Number of Steps: 2  Bathroom Shower/Tub: Walk-in shower  Bathroom Toilet: Handicap height  Bathroom Accessibility: Accessible  Home Equipment: Reacher (has access to cane and walker at home)  Prior Level of Assist for ADLs: Independent  Prior Level of Assist for Homemaking: Independent  Homemaking Responsibilities: Yes  Meal Prep Responsibility: Secondary  Laundry Responsibility: Primary  Cleaning Responsibility: Primary  Shopping Responsibility: Secondary  Prior Level of Assist for Ambulation: Independent community ambulator, with or without device  Prior Level of Assist for Transfers: Independent  Active 
03/25/2025 05:48 AM    BASOSABS 0.04 03/25/2025 05:48 AM    DIFFTYPE NOT REPORTED 03/22/2018 10:53 AM     BMP:    Lab Results   Component Value Date/Time     03/25/2025 05:48 AM    K 4.5 03/25/2025 05:48 AM     03/25/2025 05:48 AM    CO2 23 03/25/2025 05:48 AM    BUN 7 03/25/2025 05:48 AM    CREATININE 0.7 03/25/2025 05:48 AM    CALCIUM 7.5 03/25/2025 05:48 AM    GFRAA >60 03/22/2018 10:53 AM    LABGLOM >90 03/25/2025 05:48 AM    GLUCOSE 84 03/25/2025 05:48 AM           Physical Exam:  Vitals: BP (!) 108/51   Pulse 63   Temp 98.2 °F (36.8 °C) (Temporal)   Resp 16   Ht 1.575 m (5' 2\")   Wt 78 kg (171 lb 14.4 oz)   SpO2 95%   BMI 31.44 kg/m²   24 hour intake/output:  Intake/Output Summary (Last 24 hours) at 3/25/2025 1034  Last data filed at 3/25/2025 0626  Gross per 24 hour   Intake 1959.16 ml   Output 700 ml   Net 1259.16 ml     Last 3 weights:  Wt Readings from Last 3 Encounters:   03/25/25 78 kg (171 lb 14.4 oz)   03/22/18 81.6 kg (180 lb)   03/02/11 76.2 kg (168 lb)     HEENT: Normocephalic and Atraumatic  Neck: Supple, No Masses, Tenderness, Nodularity, and No Lymphadenopathy  Chest/Lungs: Clear to Auscultation without Rales, Rhonchi, or Wheezes  Cardiac: Regular Rate and Rhythm  GI/Abdomen: Bowel Sounds Present generalized tenderness especially suprapubic without Guarding or Rebound Tenderness  : Not examined  EXT/Skin: No Edema, No Cyanosis, and No Clubbing  Neuro: Alert and Oriented, to Person, to Time, to Place, to Situation, and No Localizing Signs/Symptoms      Assessment:    Principal Problem:    Acute diverticulitis  Resolved Problems:    * No resolved hospital problems. *    ANA PATINO  70 WM   [tariq Jane, Pharm D. MD---[facility]; ketan Esposito, Endocrinology; jr Santacruz ]  FULL CODE    LOVENOX     Anti-infectives:   Zosyn IV    Sigmoid diverticulitis--with peritonitis----3.24.2025  Hyponatremia             CT AP---3.24.2025---mild colonic 
MD Adrian   estradiol (CLIMARA) 0.1 MG/24HR Place 1 patch onto the skin once a week 3/2/18  Yes Radha, MD Adrian   Levothyroxine Sodium (SYNTHROID PO) Take 100 mcg by mouth daily   Yes Adrian Garcia MD   denosumab (PROLIA) 60 MG/ML SOSY SC injection Inject 1 mL into the skin    Adrian Garcia MD   secukinumab (COSENTYX SENSOREADY PEN) 150 MG/ML SOAJ Inject 1 mL into the skin    Adrian Garcia MD   clobetasol (TEMOVATE) 0.05 % external solution  12/22/17   Adrian Garcia MD   ALPRAZolam (XANAX) 0.25 MG tablet  12/22/17   Adrian Garcia MD   .  Recent Surgical History: None = 0     Assessment     Peak Flow (asthma only)    Predicted: 314  Personal Best: 306    % Predicted 97  Peak Flow : 80- 100% = 0    FEV1/FVC    FEV1 Predicted 1.97      FEV1 2.4    FEV1 % Predicted 89%  FVC 2.7  IS volume 1500      RR 14  Breath Sounds: clear      Bronchodilator assessment at level  1  Hyperinflation assessment at level 1  Secretion Management assessment at level  1    [x]    Bronchodilator Assessment  BRONCHODILATOR ASSESSMENT SCORE  Score 0 1 2 3 4 5   Breath Sounds   []  Patient Baseline [x]  No Wheeze good aeration []  Faint, scattered wheezing, good aeration []  Expiratory Wheezing and or moderately diminished []  Insp/Exp wheeze and/or very diminished []  Insp/Exp and/ or marked distress   Respiratory Rate   []  Patient Baseline [x]  Less than 20 []  Less than 20 []  20-25 []  Greater than 25 []  Greater than 25   Peak flow % of Pred or PB []  NA   [x]  Greater than 90%  []  81-90% []  71-80% []  Less than or equal to 70%  or unable to perform []  Unable due to Respiratory Distress   Dyspnea re []  Patient Baseline [x]  No SOB []  No SOB []  SOB on exertion []  SOB min activity []  At rest/acute   e FEV% Predicted       []  NA [x]  Above 69%  []  Unable []  Above 60-69%  []  Unable []  Above 50-59%  []  Unable []  Above 35-49%  []  Unable []  Less than 35%  []  Unable    
[facility]  Family/Friends No family/friends present    Assessment and Plan of Care:     Patient Interventions include: Facilitated expression of thoughts and feelings  Family/Friends Interventions include: Facilitated expression of thoughts and feelings    Patient Plan of Care: No spiritual needs identified for follow-up and Spiritual Care available upon further referral  Family/Friends Plan of Care: No family/friends present    Electronically signed by Chaplain Minnie on 3/25/2025 at 10:06 AM

## 2025-03-27 NOTE — DISCHARGE INSTRUCTIONS
Follow up with Dr. Jane in 1 week.   Please call [phone] to schedule this appointment.    Follow up with General Surgery in 4 weeks.     Recommend taking a Probiotic OTC three times daily for 10 days

## 2025-03-27 NOTE — PLAN OF CARE
Problem: Discharge Planning  Goal: Discharge to home or other facility with appropriate resources  3/26/2025 2116 by Ave Kemp RN  Outcome: Progressing  3/26/2025 0758 by Shock, Rylee, RN  Outcome: Progressing     Problem: Pain  Goal: Verbalizes/displays adequate comfort level or baseline comfort level  3/26/2025 2116 by Ave Kemp RN  Outcome: Progressing  3/26/2025 0758 by Shock, Rylee, RN  Outcome: Progressing     Problem: ABCDS Injury Assessment  Goal: Absence of physical injury  3/26/2025 2116 by Ave Kemp RN  Outcome: Progressing  3/26/2025 0758 by Shock, Rylee, RN  Outcome: Progressing     Problem: Metabolic/Fluid and Electrolytes - Adult  Goal: Electrolytes maintained within normal limits  3/26/2025 2116 by Ave Kemp RN  Outcome: Progressing  3/26/2025 0758 by Shock, Rylee, RN  Outcome: Progressing  Goal: Hemodynamic stability and optimal renal function maintained  3/26/2025 2116 by Ave Kemp RN  Outcome: Progressing  3/26/2025 0758 by Shock, Rylee, RN  Outcome: Progressing  Goal: Glucose maintained within prescribed range  3/26/2025 2116 by Ave Kemp RN  Outcome: Progressing  3/26/2025 0758 by Shock, Rylee, RN  Outcome: Progressing     Problem: Gastrointestinal - Adult  Goal: Minimal or absence of nausea and vomiting  3/26/2025 2116 by Ave Kemp RN  Outcome: Progressing  3/26/2025 0758 by Shock, Rylee, RN  Outcome: Progressing  Goal: Maintains or returns to baseline bowel function  3/26/2025 2116 by Ave Kemp RN  Outcome: Progressing  3/26/2025 0758 by Shock, Rylee, RN  Outcome: Progressing  Goal: Maintains adequate nutritional intake  3/26/2025 2116 by Ave Kemp RN  Outcome: Progressing  3/26/2025 0758 by Shock, Rylee, RN  Outcome: Progressing  Goal: Establish and maintain optimal ostomy function  3/26/2025 2116 by Ave Kemp RN  Outcome: Progressing  3/26/2025 0758 by Shock, Rylee, RN  Outcome: Progressing

## 2025-03-27 NOTE — DISCHARGE INSTR - DIET
Good nutrition is important when healing from an illness, injury, or surgery.  Follow any nutrition recommendations given to you during your hospital stay.   If you were given an oral nutrition supplement while in the hospital, continue to take this supplement at home.  You can take it with meals, in-between meals, and/or before bedtime. These supplements can be purchased at most local grocery stores, pharmacies, and chain [employer]-stores.   If you have any questions about your diet or nutrition, call the hospital and ask for the dietitian.  Clear Liquid Diet. Advance as tolerated

## 2025-03-27 NOTE — DISCHARGE SUMMARY
[facility]                [address] [place] [address]                            DISCHARGE SUMMARY      PATIENT NAME: ANA PATINO                : 1955  MED REC NO: [id number]                         ROOM: 0217  ACCOUNT NO: [id number]                       ADMIT DATE: 2025  PROVIDER: Ermias Osei MD      ATTENDING PHYSICIAN OF HOSPITALIZATION:  DEISY Osei MD.    PERSONAL PHYSICIAN:  Chon Jane MD, Family Practice, [place], [place].    CONSULTING PHYSICIAN:  Zaheer Santacruz DO, General Surgery, [facility].    DIAGNOSES:    1. Sigmoid diverticulitis with peritonitis, 2025.  2. Hyponatremia, corrected.  3. CT of abdomen and pelvis, 2025, persistent but improving sigmoid diverticulitis.  No abscess or free air.  11 mm adrenal nodule, needs to be rechecked in 1 year.  4. Acute abdominal series, 2025, no acute cardiopulmonary process.  No intestinal obstruction or perforation.  No free air.  Posterior fixation of hardware at L4-L5.  Bilateral lower extremity total hip arthroplasties, stimulators and leads visualized.  5. CTAP, 2025.  Mild colonic diverticulosis, uncomplicated distal sigmoid diverticulitis, prominent peritonitis, trace reactive bursitis, minimum intrahepatic and extrahepatic biliary dilatation, moderate gallbladder distention.  No acute cholecystitis.  Indeterminate 1.1 cm left adrenal lesion, most likely a benign adenoma.  6. Hypertension.  7. Hyperlipidemia.  8. Mitral valve prolapse.  9. Hypothyroidism.  10. Asthma.  11. Bronchiectasis.  12. Chronic kidney disease, stage 1.  13. Gastroesophageal reflux disease.  14. Psoriatic arthritis and psoriasis, has been on disease-modifying antirheumatic drugs.  15. Gluten intolerance.  16. Pancreatic insufficiency.  17. Chronic pain syndrome, see above.  18. Fibromyalgia.  19. Osteoarthritis of the neck, back, hips and knees.  20. Obstructive

## 2025-03-27 NOTE — PLAN OF CARE
Problem: Discharge Planning  Goal: Discharge to home or other facility with appropriate resources  Outcome: Adequate for Discharge     Problem: Pain  Goal: Verbalizes/displays adequate comfort level or baseline comfort level  Outcome: Adequate for Discharge     Problem: ABCDS Injury Assessment  Goal: Absence of physical injury  Outcome: Adequate for Discharge     Problem: Metabolic/Fluid and Electrolytes - Adult  Goal: Electrolytes maintained within normal limits  Outcome: Adequate for Discharge  Goal: Hemodynamic stability and optimal renal function maintained  Outcome: Adequate for Discharge  Goal: Glucose maintained within prescribed range  Outcome: Adequate for Discharge     Problem: Gastrointestinal - Adult  Goal: Minimal or absence of nausea and vomiting  Outcome: Adequate for Discharge  Goal: Maintains or returns to baseline bowel function  Outcome: Adequate for Discharge  Goal: Maintains adequate nutritional intake  Outcome: Adequate for Discharge  Goal: Establish and maintain optimal ostomy function  Outcome: Adequate for Discharge     Problem: Safety - Adult  Goal: Free from fall injury  Outcome: Adequate for Discharge

## 2025-03-28 ENCOUNTER — FOLLOWUP TELEPHONE ENCOUNTER (OUTPATIENT)
Dept: INPATIENT UNIT | Age: 70
End: 2025-03-28

## 2025-05-09 PROBLEM — M25.559 ARTHRALGIA OF HIP: Status: ACTIVE | Noted: 2018-02-07

## 2025-05-09 PROBLEM — M25.569 KNEE PAIN: Status: ACTIVE | Noted: 2018-02-07

## 2025-05-09 PROBLEM — M54.50 LOW BACK PAIN: Status: ACTIVE | Noted: 2025-05-09

## 2025-05-09 PROBLEM — M54.2 NECK PAIN: Status: ACTIVE | Noted: 2025-05-09

## 2025-05-09 PROBLEM — K90.41 GLUTEN INTOLERANCE: Status: ACTIVE | Noted: 2017-04-24

## 2025-05-09 PROBLEM — I10 HYPERTENSION: Status: ACTIVE | Noted: 2017-02-08

## 2025-05-09 PROBLEM — B00.1 HERPES LABIALIS: Status: ACTIVE | Noted: 2018-02-07

## 2025-05-09 PROBLEM — E04.1 THYROID NODULE: Status: ACTIVE | Noted: 2018-02-07

## 2025-05-09 PROBLEM — M79.89 SWELLING OF LOWER EXTREMITY: Status: ACTIVE | Noted: 2018-02-07

## 2025-05-09 PROBLEM — R13.14 PHARYNGOESOPHAGEAL DYSPHAGIA: Status: ACTIVE | Noted: 2018-02-21

## 2025-05-09 PROBLEM — R92.8 ABNORMAL MAMMOGRAM: Status: ACTIVE | Noted: 2018-02-07

## 2025-05-09 PROBLEM — M62.830 SPASM OF BACK MUSCLES: Status: ACTIVE | Noted: 2018-02-07

## 2025-05-09 PROBLEM — M48.061 SPINAL STENOSIS OF LUMBAR REGION: Status: ACTIVE | Noted: 2025-05-09

## 2025-05-09 PROBLEM — M47.22 CERVICAL SPONDYLOSIS WITH RADICULOPATHY: Status: ACTIVE | Noted: 2025-05-09

## 2025-05-09 PROBLEM — K58.9 IRRITABLE COLON: Status: ACTIVE | Noted: 2017-04-24

## 2025-05-09 PROBLEM — G43.909 MIGRAINE HEADACHE: Status: ACTIVE | Noted: 2017-02-08

## 2025-05-09 PROBLEM — M43.10 ACQUIRED SPONDYLOLISTHESIS: Status: ACTIVE | Noted: 2025-05-09

## 2025-05-09 PROBLEM — E21.3 HYPERPARATHYROIDISM: Status: ACTIVE | Noted: 2018-02-07

## 2025-05-09 PROBLEM — I34.1 MITRAL VALVE PROLAPSE: Status: ACTIVE | Noted: 2017-02-08

## 2025-05-09 PROBLEM — R60.0 PERIPHERAL EDEMA: Status: ACTIVE | Noted: 2018-02-07

## 2025-05-09 RX ORDER — GUAIFENESIN 1200 MG/1
1200 TABLET, EXTENDED RELEASE ORAL DAILY
COMMUNITY

## 2025-05-09 RX ORDER — ANTACID TABLETS 500 MG/1
1 TABLET, CHEWABLE ORAL DAILY
COMMUNITY

## 2025-05-09 RX ORDER — CEPHALEXIN 500 MG/1
CAPSULE ORAL
COMMUNITY
Start: 2025-02-19

## 2025-05-09 RX ORDER — DIAZEPAM 5 MG/1
TABLET ORAL
COMMUNITY
Start: 2025-03-20

## 2025-05-09 RX ORDER — GUAIFENESIN 600 MG/1
TABLET, EXTENDED RELEASE ORAL
COMMUNITY

## 2025-05-09 RX ORDER — SECUKINUMAB 150 MG/ML
INJECTION SUBCUTANEOUS
COMMUNITY
Start: 2025-02-17

## 2025-05-14 ENCOUNTER — OFFICE VISIT (OUTPATIENT)
Dept: SURGERY | Age: 70
End: 2025-05-14
Payer: MEDICARE

## 2025-05-14 ENCOUNTER — PREP FOR PROCEDURE (OUTPATIENT)
Dept: SURGERY | Age: 70
End: 2025-05-14

## 2025-05-14 VITALS — HEART RATE: 67 BPM | SYSTOLIC BLOOD PRESSURE: 118 MMHG | OXYGEN SATURATION: 96 % | DIASTOLIC BLOOD PRESSURE: 72 MMHG

## 2025-05-14 DIAGNOSIS — Z87.19 HISTORY OF DIVERTICULITIS OF COLON: ICD-10-CM

## 2025-05-14 DIAGNOSIS — Z12.11 COLON CANCER SCREENING: ICD-10-CM

## 2025-05-14 DIAGNOSIS — Z87.19 HX OF DIVERTICULITIS OF COLON: Primary | ICD-10-CM

## 2025-05-14 PROCEDURE — 3078F DIAST BP <80 MM HG: CPT | Performed by: SURGERY

## 2025-05-14 PROCEDURE — 1036F TOBACCO NON-USER: CPT | Performed by: SURGERY

## 2025-05-14 PROCEDURE — 1159F MED LIST DOCD IN RCRD: CPT | Performed by: SURGERY

## 2025-05-14 PROCEDURE — G8400 PT W/DXA NO RESULTS DOC: HCPCS | Performed by: SURGERY

## 2025-05-14 PROCEDURE — 1123F ACP DISCUSS/DSCN MKR DOCD: CPT | Performed by: SURGERY

## 2025-05-14 PROCEDURE — 99215 OFFICE O/P EST HI 40 MIN: CPT | Performed by: SURGERY

## 2025-05-14 PROCEDURE — 1090F PRES/ABSN URINE INCON ASSESS: CPT | Performed by: SURGERY

## 2025-05-14 PROCEDURE — 3074F SYST BP LT 130 MM HG: CPT | Performed by: SURGERY

## 2025-05-14 PROCEDURE — 99214 OFFICE O/P EST MOD 30 MIN: CPT | Performed by: SURGERY

## 2025-05-14 PROCEDURE — 3017F COLORECTAL CA SCREEN DOC REV: CPT | Performed by: SURGERY

## 2025-05-14 PROCEDURE — G8417 CALC BMI ABV UP PARAM F/U: HCPCS | Performed by: SURGERY

## 2025-05-14 PROCEDURE — G8427 DOCREV CUR MEDS BY ELIG CLIN: HCPCS | Performed by: SURGERY

## 2025-05-14 RX ORDER — POLYETHYLENE GLYCOL 3350, SODIUM SULFATE ANHYDROUS, SODIUM BICARBONATE, SODIUM CHLORIDE, POTASSIUM CHLORIDE 236; 22.74; 6.74; 5.86; 2.97 G/4L; G/4L; G/4L; G/4L; G/4L
POWDER, FOR SOLUTION ORAL
Qty: 4000 ML | Refills: 0 | Status: ON HOLD | OUTPATIENT
Start: 2025-05-14 | End: 2025-05-21

## 2025-05-14 RX ORDER — BISACODYL 5 MG
TABLET, DELAYED RELEASE (ENTERIC COATED) ORAL
Qty: 2 TABLET | Refills: 0 | Status: ON HOLD | OUTPATIENT
Start: 2025-05-14 | End: 2025-05-21 | Stop reason: ALTCHOICE

## 2025-05-14 NOTE — ASSESSMENT & PLAN NOTE
Will plan for colonoscopy for follow-up evaluation of colon.  Risks of the procedure including bleeding, infection, perforation, need for the surgery and anesthesia risks are discussed and consent is obtained.    As her rheumatologist has requested biopsies be taken to evaluate for microscopic colitis we will plan to get that done with random biopsies in the colon unless we find specific areas of inflammation which will then also be biopsied.  I will follow-up results and inform patient once we have the pathology back.

## 2025-05-14 NOTE — PROGRESS NOTES
note that portions of this note were completed with a voice recognition program.  Efforts were made to edit the dictations but occasionally words are mis-transcribed.)

## 2025-05-20 NOTE — H&P
Subjective  Yaz Torres is a 70 y.o. female who presents today for follow-up after recent hospitalization for acute uncomplicated diverticulitis.  Patient was treated with antibiotics with good improvement was discharged home.  Since surgery patient reports she has been doing well since discharge from the hospital.  She does report about a week after leaving the hospital which was around the time she was finishing her antibiotics she had a little bit of a bout of diarrhea but that has resolved without any treatment.  Having regular bowel function at this point.  Denies any abdominal pain.  No other complaints.  Reports last colonoscopy was 7 to 8 years ago and reports that there was no significant findings per her recollection..     As I talked to her today she reports that her rheumatologist has requested that during the colonoscopy we do biopsies to evaluate for possible microscopic colitis.     Past Medical History        Past Medical History:   Diagnosis Date    Acne rosacea      Anxiety      CMV infection (HCC)       chronic    Gluten intolerance      Heart murmur      Hypertension      Hypoglycemia      Hypothyroidism      Other disorders of kidney and ureter in diseases classified elsewhere      Palpitations      Pneumonia              Past Surgical History         Past Surgical History:   Procedure Laterality Date    BACK SURGERY        COLONOSCOPY        HYSTERECTOMY (CERVIX STATUS UNKNOWN)        JOINT REPLACEMENT Bilateral       hips    LASIK   05/18/1999            Current Facility-Administered Medications          Current Outpatient Medications   Medication Sig Dispense Refill    cephALEXin (KEFLEX) 500 MG capsule          diazePAM (VALIUM) 5 MG tablet TAKE 1 TO 2 TABLETS BY MOUTH AS NEEDED 40 minutes prior TO procedure FOR 2 DAYS        guaiFENesin 1200 MG TB12 Take 1,200 mg by mouth daily        fluticasone furoate-vilanterol (BREO ELLIPTA) 200-25 MCG/ACT AEPB inhaler Inhale 1 puff into the lungs

## 2025-05-21 ENCOUNTER — HOSPITAL ENCOUNTER (OUTPATIENT)
Age: 70
Setting detail: OUTPATIENT SURGERY
Discharge: HOME | End: 2025-05-21
Attending: SURGERY | Admitting: SURGERY
Payer: MEDICARE

## 2025-05-21 ENCOUNTER — ANESTHESIA EVENT (OUTPATIENT)
Dept: OPERATING ROOM | Age: 70
End: 2025-05-21
Payer: MEDICARE

## 2025-05-21 ENCOUNTER — ANESTHESIA (OUTPATIENT)
Dept: OPERATING ROOM | Age: 70
End: 2025-05-21
Payer: MEDICARE

## 2025-05-21 VITALS
HEIGHT: 62 IN | OXYGEN SATURATION: 100 % | DIASTOLIC BLOOD PRESSURE: 47 MMHG | SYSTOLIC BLOOD PRESSURE: 113 MMHG | WEIGHT: 153 LBS | TEMPERATURE: 97 F | BODY MASS INDEX: 28.16 KG/M2 | RESPIRATION RATE: 16 BRPM | HEART RATE: 52 BPM

## 2025-05-21 DIAGNOSIS — Z12.11 COLON CANCER SCREENING: ICD-10-CM

## 2025-05-21 DIAGNOSIS — Z87.19 HISTORY OF DIVERTICULITIS OF COLON: ICD-10-CM

## 2025-05-21 PROCEDURE — 7100000011 HC PHASE II RECOVERY - ADDTL 15 MIN: Performed by: SURGERY

## 2025-05-21 PROCEDURE — 88305 TISSUE EXAM BY PATHOLOGIST: CPT

## 2025-05-21 PROCEDURE — 3700000001 HC ADD 15 MINUTES (ANESTHESIA): Performed by: SURGERY

## 2025-05-21 PROCEDURE — 2709999900 HC NON-CHARGEABLE SUPPLY: Performed by: SURGERY

## 2025-05-21 PROCEDURE — 2580000003 HC RX 258: Performed by: SURGERY

## 2025-05-21 PROCEDURE — 3700000000 HC ANESTHESIA ATTENDED CARE: Performed by: SURGERY

## 2025-05-21 PROCEDURE — 7100000010 HC PHASE II RECOVERY - FIRST 15 MIN: Performed by: SURGERY

## 2025-05-21 PROCEDURE — 6360000002 HC RX W HCPCS: Performed by: NURSE ANESTHETIST, CERTIFIED REGISTERED

## 2025-05-21 PROCEDURE — 3609027000 HC COLONOSCOPY: Performed by: SURGERY

## 2025-05-21 RX ORDER — SODIUM CHLORIDE 9 MG/ML
INJECTION, SOLUTION INTRAVENOUS PRN
Status: DISCONTINUED | OUTPATIENT
Start: 2025-05-21 | End: 2025-05-21 | Stop reason: HOSPADM

## 2025-05-21 RX ORDER — SODIUM CHLORIDE 0.9 % (FLUSH) 0.9 %
5-40 SYRINGE (ML) INJECTION EVERY 12 HOURS SCHEDULED
Status: DISCONTINUED | OUTPATIENT
Start: 2025-05-21 | End: 2025-05-21 | Stop reason: HOSPADM

## 2025-05-21 RX ORDER — SODIUM CHLORIDE, SODIUM LACTATE, POTASSIUM CHLORIDE, CALCIUM CHLORIDE 600; 310; 30; 20 MG/100ML; MG/100ML; MG/100ML; MG/100ML
INJECTION, SOLUTION INTRAVENOUS CONTINUOUS
Status: DISCONTINUED | OUTPATIENT
Start: 2025-05-21 | End: 2025-05-21 | Stop reason: HOSPADM

## 2025-05-21 RX ORDER — PROPOFOL 10 MG/ML
INJECTION, EMULSION INTRAVENOUS
Status: DISCONTINUED | OUTPATIENT
Start: 2025-05-21 | End: 2025-05-21 | Stop reason: SDUPTHER

## 2025-05-21 RX ORDER — SODIUM CHLORIDE 0.9 % (FLUSH) 0.9 %
5-40 SYRINGE (ML) INJECTION PRN
Status: DISCONTINUED | OUTPATIENT
Start: 2025-05-21 | End: 2025-05-21 | Stop reason: HOSPADM

## 2025-05-21 RX ADMIN — PHENYLEPHRINE HYDROCHLORIDE 100 MCG: 10 INJECTION INTRAVENOUS at 09:53

## 2025-05-21 RX ADMIN — PROPOFOL 200 MCG/KG/MIN: 10 INJECTION, EMULSION INTRAVENOUS at 09:24

## 2025-05-21 RX ADMIN — SODIUM CHLORIDE, POTASSIUM CHLORIDE, SODIUM LACTATE AND CALCIUM CHLORIDE: 600; 310; 30; 20 INJECTION, SOLUTION INTRAVENOUS at 08:54

## 2025-05-21 RX ADMIN — PROPOFOL 100 MG: 10 INJECTION, EMULSION INTRAVENOUS at 09:23

## 2025-05-21 ASSESSMENT — PAIN SCALES - GENERAL
PAINLEVEL_OUTOF10: 0

## 2025-05-21 ASSESSMENT — PAIN - FUNCTIONAL ASSESSMENT
PAIN_FUNCTIONAL_ASSESSMENT: 0-10
PAIN_FUNCTIONAL_ASSESSMENT: ADULT NONVERBAL PAIN SCALE (NPVS)

## 2025-05-21 NOTE — ANESTHESIA POSTPROCEDURE EVALUATION
Department of Anesthesiology  Postprocedure Note    Patient: Yaz Torres  MRN: [id number]  YOB: 1955  Date of evaluation: 5/21/2025    Procedure Summary       Date: 05/21/25 Room / Location: [facility]    Anesthesia Start: 0922 Anesthesia Stop: 1000    Procedure: Colonoscopy, POLYPECTOMY, AND BIOPSIES Diagnosis:       Colon cancer screening      History of diverticulitis of colon      (Colon cancer screening [Z12.11])      (History of diverticulitis of colon [Z87.19])    Surgeons: Zaheer Santacruz DO Responsible Provider: Jovanny Pink APRN - CRNA    Anesthesia Type: General, TIVA ASA Status: 3            Anesthesia Type: General, TIVA    Fiona Phase I: Fiona Score: 10    Fiona Phase II: Fiona Score: 9    Anesthesia Post Evaluation    Patient location during evaluation: bedside  Level of consciousness: sleepy but conscious  Airway patency: patent  Nausea & Vomiting: no nausea and no vomiting  Cardiovascular status: hemodynamically stable  Respiratory status: spontaneous ventilation  Hydration status: stable  Pain management: satisfactory to patient    No notable events documented.

## 2025-05-21 NOTE — PROCEDURES
[place] [address]                               COLONOSCOPY      PATIENT NAME: ANA PATINO                : 1955  MED REC NO: [id number]                         ROOM: [facility]  ACCOUNT NO: [id number]                       ADMIT DATE: 2025  PROVIDER: Zaheer Santacruz DO      DATE OF PROCEDURE: 2025    SURGEON:  Zaheer Santacruz DO    ASSISTANT:  None.    PREOPERATIVE DIAGNOSIS:  History of diverticulitis.    POSTOPERATIVE DIAGNOSES:    1. History of diverticulitis.  2. Diverticulosis.  3. Colon polyps.    PROCEDURE:  Colonoscopy with biopsies and hot forceps polypectomy.    ANESTHESIA:  MAC.    ESTIMATED BLOOD LOSS:  Minimal.    FLUIDS:  Per Anesthesia record.    COMPLICATIONS:  None.    SPECIMENS:    1. Biopsy in ascending colon.  2. Biopsy in transverse colon.  3. Biopsy in descending colon.  4. Biopsy in sigmoid colon.  5. Rectal biopsy.  6. Rectal polyps x2, removed with hot forceps and sent together as specimen.    INDICATION FOR PROCEDURE:  The patient is a 70-year-old female who was initially seen in the hospital for diverticulitis.  She was managed conservatively and was discharged home.  Plans were made for outpatient colonoscopy 6 to 8 weeks after resolution of symptoms.  Prior to the time of the procedure, risks, benefits, and alternatives were explained to the patient and consent was obtained.    DESCRIPTION OF PROCEDURE:  The patient was brought to the endoscopy suite, kept on preop gurney, placed in left lateral decubitus position.  Monitoring devices were placed.  MAC anesthesia was induced.  After induction of anesthesia, a time-out was performed, and correct patient and procedure were verified.  Digital rectal exam was performed, which showed no abnormalities.  The Olympus video endoscope was lubricated, inserted in the patient's rectum, which was gently insufflated with air.  Scope was then

## 2025-05-21 NOTE — ANESTHESIA PRE PROCEDURE
Hives     Other Reaction(s): Hives   • Tramadol Nausea Only and Headaches     Other Reaction(s):Tremor    Severe head ache and body begins to tremble      • Adhesive Tape Other (See Comments)   • Celecoxib Other (See Comments)   • Hydrocodone-Acetaminophen Itching   • Klaron [Sulfacetamide]      hives   • Lactose Diarrhea     Other Reaction(s): Abdominal pain   • Leflunomide      Other Reaction(s): Leg swelling symptom   • Meloxicam      Other Reaction(s): Liver enzymes abnormal   • Minocin [Minocycline]      vertigo   • Other/Food      eggs   • Tilactase Diarrhea   • Bactrim [Sulfamethoxazole-Trimethoprim] Rash       Problem List:    Patient Active Problem List   Diagnosis Code   • Acute diverticulitis K57.92   • Cervical spondylosis with radiculopathy M47.22   • Abnormal liver function tests R79.89   • Abnormal mammogram R92.8   • Acquired spondylolisthesis M43.10   • Acute sinusitis J01.90   • Arthralgia of hip M25.559   • Asthma J45.909   • Atopic rhinitis J30.9   • Bacterial overgrowth syndrome K63.89   • Chronic pain disorder G89.4   • Fibromyalgia M79.7   • Neck pain M54.2   • Depression F32.A   • Dizziness R42   • Gastroesophageal reflux disease K21.9   • Gluten intolerance K90.41   • Hirsutism L68.0   • Herpes labialis B00.1   • Hyperlipidemia E78.5   • Hyperparathyroidism E21.3   • Hypertension I10   • Hypothyroidism E03.9   • Thyroid nodule E04.1   • Indigestion K30   • Insomnia G47.00   • Irritable colon K58.9   • Knee pain M25.569   • Low back pain M54.50   • Migraine headache G43.909   • Mitral valve prolapse I34.1   • Pancreatic insufficiency K86.89   • Peripheral edema R60.0   • Pharyngoesophageal dysphagia R13.14   • Primary osteoarthritis of right hip M16.11   • Primary osteoarthritis of right hip M16.11   • Primary osteoarthritis of right knee M17.11   • Psoriasis L40.9   • Psoriatic arthritis (HCC) L40.50   • Spasm of back muscles M62.830   • Spinal stenosis of lumbar region M48.061   • Swelling

## 2025-05-21 NOTE — BRIEF OP NOTE
Brief Postoperative Note      Patient: Yaz Torres  YOB: 1955  MRN: [id number]    Date of Procedure: 5/21/2025    Pre-Op Diagnosis Codes:      * Colon cancer screening [Z12.11]     * History of diverticulitis of colon [Z87.19]    Post-Op Diagnosis: Same       Procedure(s):  Colonoscopy, POLYPECTOMY, AND BIOPSIES    Surgeon(s):  Zaheer Santacruz DO    Assistant:  * No surgical staff found *    Anesthesia: General    Estimated Blood Loss (mL): Minimal    Complications: None    Specimens:   ID Type Source Tests Collected by Time Destination   A : ASCENDING COLON BIOPSY Tissue Colon SURGICAL PATHOLOGY Zaheer Santacruz,  5/21/2025 0943    B : TRANSVERSE COLON BIOPSY Tissue Colon SURGICAL PATHOLOGY Zaheer Santacruz,  5/21/2025 0945    C : DESCENDING COLON BIOPSY Tissue Colon SURGICAL PATHOLOGY Zaheer Santacruz,  5/21/2025 0946    D : SIGMOID COLON BIOPSY Tissue Colon SURGICAL PATHOLOGY Zaheer Santacruz,  5/21/2025 0947    E : RECTAL BIOPSY Tissue Colon SURGICAL PATHOLOGY Zaheer Santacruz,  5/21/2025 0948    F : RECTAL POLYPS  HOT BIOPSY Tissue Rectum SURGICAL PATHOLOGY Zaheer Santacruz,  5/21/2025 0949        Implants:  * No implants in log *      Drains: * No LDAs found *    Findings:  Infection Present At Time Of Surgery (PATOS) (choose all levels that have infection present):  No infection present  Other Findings: See dictation    Electronically signed by Zaheer Santacruz DO on 5/21/2025 at 9:57 AM

## 2025-05-22 ENCOUNTER — RESULTS FOLLOW-UP (OUTPATIENT)
Dept: SURGERY | Age: 70
End: 2025-05-22

## 2025-05-22 LAB — SURGICAL PATHOLOGY REPORT: NORMAL

## 2025-06-13 PROBLEM — Z12.11 COLON CANCER SCREENING: Status: RESOLVED | Noted: 2025-05-14 | Resolved: 2025-06-13

## (undated) DEVICE — CO2 CANNULA,SSOFT,ADLT,7O2,4CO2,FEMALE: Brand: MEDLINE

## (undated) DEVICE — SYRINGE INFL 60ML DISP ALLIANCE II

## (undated) DEVICE — PAD, GROUNDING, UNIVERSAL, SPLIT, 9': Brand: MEDLINE

## (undated) DEVICE — FORCEPS BX L240CM JAW DIA2.2MM RAD JAW 4 HOT DISP

## (undated) DEVICE — 4-PORT MANIFOLD: Brand: NEPTUNE 2

## (undated) DEVICE — MERCY DEFIANCE ENDO KIT: Brand: MEDLINE INDUSTRIES, INC.